# Patient Record
Sex: FEMALE | Race: WHITE | NOT HISPANIC OR LATINO | Employment: OTHER | URBAN - METROPOLITAN AREA
[De-identification: names, ages, dates, MRNs, and addresses within clinical notes are randomized per-mention and may not be internally consistent; named-entity substitution may affect disease eponyms.]

---

## 2018-09-12 ENCOUNTER — RECORDS - HEALTHEAST (OUTPATIENT)
Dept: LAB | Facility: CLINIC | Age: 53
End: 2018-09-12

## 2018-09-12 LAB
T4 FREE SERPL-MCNC: 0.8 NG/DL (ref 0.7–1.8)
TSH SERPL DL<=0.005 MIU/L-ACNC: 2.07 UIU/ML (ref 0.3–5)

## 2018-09-13 LAB — 25(OH)D3 SERPL-MCNC: 28.2 NG/ML (ref 30–80)

## 2019-02-14 ENCOUNTER — HOSPITAL ENCOUNTER (OUTPATIENT)
Dept: BEHAVIORAL HEALTH | Facility: CLINIC | Age: 54
Discharge: HOME | End: 2019-02-14
Attending: PSYCHIATRY & NEUROLOGY | Admitting: PSYCHIATRY & NEUROLOGY
Payer: MEDICARE

## 2019-02-14 ENCOUNTER — BEH TREATMENT PLAN (OUTPATIENT)
Dept: BEHAVIORAL HEALTH | Facility: CLINIC | Age: 54
End: 2019-02-14
Attending: PSYCHIATRY & NEUROLOGY

## 2019-02-14 DIAGNOSIS — F33.1 MODERATE EPISODE OF RECURRENT MAJOR DEPRESSIVE DISORDER (H): Primary | ICD-10-CM

## 2019-02-14 DIAGNOSIS — F31.0 BIPOLAR I DISORDER, CURRENT OR MOST RECENT EPISODE HYPOMANIC (H): ICD-10-CM

## 2019-02-14 PROCEDURE — 90791 PSYCH DIAGNOSTIC EVALUATION: CPT | Performed by: PSYCHOLOGIST

## 2019-02-14 ASSESSMENT — PAIN SCALES - GENERAL: PAINLEVEL: WORST PAIN (10)

## 2019-02-14 NOTE — PROGRESS NOTES
Acknowledgement of Current Treatment Plan       I have reviewed my Initial Individual Treatment Plan with my therapist / on 2/14/2019.   I agree with the plan as it is written in the electronic health record.                      Signature Below:  Marissa Saenz      Patient      Bright Weinstein MA [facility]  DA Psychotherapist    Admitting Provider: Admitting Provider Signature Below:   Dr Curt Bloom MD   Psychiatrist    Dr Hyun Boswell MD  Psychiatrist    Dr Tom Mullen MD  Psychiatrist    Dr Martinez Jasmine MD  Psychiatrist    Marissa Holland, MINA  Psychiatric Provider

## 2019-02-14 NOTE — PROGRESS NOTES
"Initial Individual Treatment Plan     Patient: Marissa Saenz   MRN: [id number]  : 1965  Age: 53 year old  Sex: female    Diagnostic Assessment Date / Date of Initial Individual Treatment Plan: 19      Immediate Health Concerns:  Yes. Identify health concern and plan to address: Pt reports she has leg pain and will follow up with [facility] in [facility].      Immediate Safety Concerns:  No    Identify the issues to be addressed in treatment:  Symptom Management, Community Resources/Discharge Planning, Abstinence/Relapse Prevention, Develop / Improve Independent Living Skills and Develop Socialization / Interpersonal Relationship Skills    Client Initial Individualized Goals for Treatment: \"I want to be better, I want to get out and have fun, I need to be around more people, have more friends, be social, live my life.\"       Initial Treatment suggestions for the client during the time between Diagnostic Assessment and completion of the Individualized Treatment Plan:  Abstain from Substance Use   Ask for more information, support and/or assistance as needed.  Follow up with providers/community supports as needed:   Report increases or changes in symptoms to staff.  Report any personal safety concerns to staff.   Take medications as prescribed.  Report medication changes and/or side effects to staff.  Attend and participate in groups as scheduled or notify staff if unable to do so.  Report any use of substances to staff as this may impact your symptoms and/or personal safety.  Notify staff if you have any other issues that need to be addressed. This may include any current abuse / neglect / exploitation or other vulnerability.  Follow recommendations of your treatment team and discuss concerns if not in agreement.     Treatment Team Responsible: MI/CD Treatment ([facility])      Therapeutic Interventions/Treatment Strategies may include:  Support, Redirection, Feedback, Limit/Boundaries, Safety Assessments, " Structured Activity, Problem Solving, Clarification, Education, Motivational Enhancement and Relapse Prevention as needed.    Jordan Weinstein MA [facility]
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
## 2019-03-01 NOTE — PROGRESS NOTES
"Adult Dual Disorder Progress Note / Treatment Plan Review       Patient: Marissa Saenz   MRN: [id number]  : 1965  Age: 53 year old  Sex: female    Week of: 3/4/2019-3/8/2019     Client Initial Individualized Goals for Treatment: \"I want to be better, I want to get out and have fun, I need to be around more people, have more friends, be social, live my life.\"     See Initial Treatment suggestions for the client during the time between Diagnostic Assessment and completion of the Master Individualized Treatment Plan.    Treatment Goals:     Abstain from Substance Use   Ask for more information, support and/or assistance as needed.  Follow up with providers/community supports as needed:   Report increases or changes in symptoms to staff.  Report any personal safety concerns to staff.   Take medications as prescribed.  Report medication changes and/or side effects to staff.  Attend and participate in groups as scheduled or notify staff if unable to do so.  Report any use of substances to staff as this may impact your symptoms and/or personal safety.  Notify staff if you have any other issues that need to be addressed. This may include any current abuse / neglect / exploitation or other vulnerability.  Follow recommendations of your treatment team and discuss concerns if not in agreement.    Active Psychiatric Symptoms Impairing:   Mood and Behavior    Area of Treatment Focus:  Symptom Management, Personal Safety, Community Resources/Discharge Planning, Abstinence/Relapse Prevention, Develop / Improve Independent Living Skills, Develop Socialization / Interpersonal Relationship Skills, Cultural / Spirituality and Physical Health     Treatment Strategies:   Support, Redirection, Feedback, Limit/Boundaries, Safety Assessments, Structured Activity, Problem Solving, Clarification, Education, Motivational Enhancement Therapy and Cognitive Behavioral Therapy    Patient Response:  Accepted Feedback, Gave Feedback, " Listened, Focused on Goals, Attentive, Accepted Support and Alert      Dimension Risk Description and Outcome:    Dimension One: Acute Intoxication/Withdrawal Potential   Daily Note:3/7/2019 Group Therapy 11:00-11:50 Brittany reports that her last use of alcohol was on 3/3/2019 (LE)    Dimension Two: Biomedical Conditions and Complications  Daily Note:3/5/2019 Group Therapy 10:00-10:50 Brittany reported that her leg hurt today and that she went to the pharmacy to buy alieve but couldn't find the pharmacy (LE)    Dimension Three: Emotional/Behavioral / Cognitive Conditions & Complications  Daily Note:3/4/2019 Group Therapy 11:00-11:50 Brittany checked in and reported that she is glad she is doing treatment because it is the first time she is doing something for herself in a long time, Brittany sat quietly during the group, she shared later that she is struggling with pain in her leg (LE)  3/5/2019 Group Therapy 10:00-10:50 Brittany checked in and reported that she is focusing on staying sober, she joined in the discussion about asking for help and black and white thinking, she was supportive of other group members (LE)  3/6/2019 Group Therapy 11:00-11:50 Brittany checked in and reported that she is struggling to follow through on tasks, she joined in discussion about reframing what doctors say and gave feedback to other group members (LE)  3/7/2019 Group Therapy 11:00-11:50 Brittany checked in and reported that she has been struggling with following through with tasks, she is thinking about switching rooms with her son since being in her room is a trigger, she gave some feedback to group members and was engaged in session (LE)  Dimension Four: Treatment Acceptance / Resistance  Daily Note:  3/4/2019:  Interpersonal Relationships (10-10:50am) Brittany introduced herself to the group and listened attentively to the topic that was presented.  She states that she would benefit from more teaching on assertiveness skills.  (HEIDI)  3/5//2019 MICD Education  "12:00-12:50 Psychotherapist used handouts on cognitive distortions and how to recognize  twisted thinking  and what to do to change it.  Pt verbalized understanding of the session by engaging with the feedback and discussion from other group members.  Client would benefit from additional opportunities to practice and implement content from this session (LE)  3/7/2019 Emotions Management (10-10:50am) Brittany was active and engaged during group and acknowledged her understanding of the topic by sharing how handouts/worksheets were helpful for her due to her difficulty with concentration and slowing her thoughts down and having it in front of her was useful. Brittany will benefit from continued education on emotion regulation using skill worksheets/handouts that she can follow along with and getting repetitive information due to difficulty concentrating while in group. (KJL)    Dimension Five: Continued Use/Relapse Prevention  Daily Note: Daily Note:3/6/2019 ([id number]) pt reports last use 3/4/2019. Pt reports no urges to use. Pt listened to peers read theiir \"good-bye letters\" and participated in group discussion (TARI).     Dimension Six: Recovery Environment  Daily Note:3/7/2019 Group Therapy 11:00-11:50 Brittany reported that she used to drink in her room and that she is considering changing rooms since it brings up habits and memories being in her room (LE)    Weekly Progress / Treatment Plan Review      Are there additional progress notes for this week? Yes (see additional progress notes)    Are Treatment Plan Goals being addressed? Yes, continue treatment goals    Are treatment plan strategies to address goals effective? Yes, continue treatment strategies    Are there any current contracts in place? No    Client: na     Client: na    Was client case reviewed with Tom Mullen MD and/or Hyun Boswell MD and the treatment team this week? yes    Psychotherapists contributing to this note:    Group: Psychotherapy Date/Time: " 3/4/2019 / 1100 to 1150; Number of Participants: 7  Facilitated by: AVIS Martinez (BRIAN)    Group: Interpersonal Relationships Date/Time: 3/4/2019 / 1000 to 1050; Number of Participants: 7    Facilitated by: SHARON Guerin(HEIDI)    Group: Psychotherapy Date/Time: 3/5/2019 / 1000 to 1050; Number of Participants: 4  Facilitated by: AVIS Martinez (BRIAN)    Group: MICD Education Date/Time: 3/5/2019 / 1200 to 1250; Number of Participants: 4  Facilitated by: AVIS Martinez (BRIAN)    Group: Psychotherapy Date/Time: 3/6/2019 / 1100 to 1150; Number of Participants: 6  Facilitated by: AVIS Martinez (BRIAN)    Group: Relapse Prevention Date/Time: 3/6/2019 1200 to 1250; Number of Participants: 6    Facilitated by: Bright Weinstein MA [facility]    Group: Emotions Management Date/Time: 3/7/2019  / 1000 to 1050; Number of Participants: 7    Facilitated by: IBAN Talamantes Intern (KJL)    Group: Psychotherapy Date/Time: 3/7/2019 / 1100 to 1150; Number of Participants: 7  Facilitated by: AVIS Martinez (BRIAN)        Co-Sign: This (diagnostic assessment, individual treatment plan, treatment plan review or progress note) has been reviewed and approved by meMiya MA, WALTER, [facility]  in accordance with the requirements for Clinical Supervision of Outpatient Mental Health Services.  Miya Farris MA, WALTER, [facility]  3/10/2019

## 2019-03-04 ENCOUNTER — HOSPITAL ENCOUNTER (OUTPATIENT)
Dept: BEHAVIORAL HEALTH | Facility: CLINIC | Age: 54
End: 2019-03-04
Attending: PSYCHIATRY & NEUROLOGY
Payer: MEDICARE

## 2019-03-04 PROBLEM — F31.0 BIPOLAR I DISORDER, CURRENT OR MOST RECENT EPISODE HYPOMANIC (H): Status: ACTIVE | Noted: 2019-03-04

## 2019-03-04 LAB
AMPHETAMINES UR QL SCN: NEGATIVE
BARBITURATES UR QL: NEGATIVE
BENZODIAZ UR QL: NEGATIVE
CANNABINOIDS UR QL SCN: NEGATIVE
COCAINE UR QL: NEGATIVE
ETHANOL UR QL SCN: NEGATIVE
OPIATES UR QL SCN: NEGATIVE

## 2019-03-04 PROCEDURE — H2012 BEHAV HLTH DAY TREAT, PER HR: HCPCS

## 2019-03-04 PROCEDURE — 80307 DRUG TEST PRSMV CHEM ANLYZR: CPT | Performed by: PSYCHIATRY & NEUROLOGY

## 2019-03-04 PROCEDURE — 80320 DRUG SCREEN QUANTALCOHOLS: CPT | Performed by: PSYCHIATRY & NEUROLOGY

## 2019-03-04 ASSESSMENT — PATIENT HEALTH QUESTIONNAIRE - PHQ9
5. POOR APPETITE OR OVEREATING: NEARLY EVERY DAY
SUM OF ALL RESPONSES TO PHQ QUESTIONS 1-9: 12

## 2019-03-04 ASSESSMENT — ANXIETY QUESTIONNAIRES
6. BECOMING EASILY ANNOYED OR IRRITABLE: NEARLY EVERY DAY
5. BEING SO RESTLESS THAT IT IS HARD TO SIT STILL: NEARLY EVERY DAY
IF YOU CHECKED OFF ANY PROBLEMS ON THIS QUESTIONNAIRE, HOW DIFFICULT HAVE THESE PROBLEMS MADE IT FOR YOU TO DO YOUR WORK, TAKE CARE OF THINGS AT HOME, OR GET ALONG WITH OTHER PEOPLE: VERY DIFFICULT
3. WORRYING TOO MUCH ABOUT DIFFERENT THINGS: NOT AT ALL
7. FEELING AFRAID AS IF SOMETHING AWFUL MIGHT HAPPEN: NOT AT ALL
1. FEELING NERVOUS, ANXIOUS, OR ON EDGE: SEVERAL DAYS
2. NOT BEING ABLE TO STOP OR CONTROL WORRYING: NOT AT ALL
GAD7 TOTAL SCORE: 10

## 2019-03-04 NOTE — PROGRESS NOTES
"Adult Mental Health Outpatient Group Therapy Progress Note     Client Initial Individualized Goals for Treatment:  \"I want to be better, I want to get out and have fun, I need to be around more people, have more friends, be social, live my life.\"        See Initial Treatment suggestions for the client during the time between Diagnostic Assessment and completion of the Master Individualized Treatment Plan.    Treatment Goals:     see above     Area of Treatment Focus:  Symptom Management, Community Resources/Discharge Planning, Abstinence/Relapse Prevention and Develop / Improve Independent Living Skills    Therapeutic Interventions/Treatment Strategies:  Support, Feedback, Structured Activity, Problem Solving, Clarification, Education and Motivational Enhancement Therapy    Response to Treatment Strategies:  Accepted Feedback, Listened, Focused on Goals, Attentive and Accepted Support     Name of Group: Goal Setting   Date/Time: 3/4/19 / 12:00 to 12:50    Number of Group Participants: 5     Description and Outcome:  Brittany participated in group that focused on learning about the benefits and practicing of setting realistic goals for treatment and management of mental and chemical health, along with self reflection on accomplishments and practicing problem solving obstacles. This was her first goal setting group and she followed the process and was able to share her goals. She noted her accomplishment from the previous week as \"working herself up to be able to start treatment\". She noted she had drank the previous evening, after being sober for a month, having the \"stinking thinking\" of using once more prior to treatment. She set a couple of small goals, focusing on settling into groups and sharing.  Client verbalized understanding of session content by noting benefits of focus and direction..  Client would benefit from additional opportunities to practice and implement content from this session.    Is this a Weekly " Review of the Progress on the Treatment Plan?  No

## 2019-03-04 NOTE — PROGRESS NOTES
Admission Date: 3/4/2019    The Initial Individual Treatment Plan was reviewed with Marissa Saenz upon admission.      Marissa reported her last use of substances as: 3/3/19 carol and matt about a 1/2 pint of barcardi 2/26/2019 did some cocaine, a few lines    Identify any current concerns with potential to impact admission:     withdrawal/intoxication: reports that she is not feeling any withdrawal symptoms, last drink was 8pm last night.     medication/medical concerns: reports pt is not on any medications, wants to see Dr Mullen     immediate safety concerns: na     Other: reports she drank last night to get it out of her system, had previously not drank for 2 months    DIMENSION  ADMIT RATING   1 Acute Intoxication / Withdrawal Potential 0   2 Biomedical Conditions & Complications 1   3 Emotional / Behavioral / Cognitive Conditions & Complications 2   4 Treatment Acceptance / Resistance: 1   5 Continued Use / Relapse Prevention 3   6 Recovery Environment 1         Completed by: AVIS Martinez

## 2019-03-05 ENCOUNTER — HOSPITAL ENCOUNTER (OUTPATIENT)
Dept: BEHAVIORAL HEALTH | Facility: CLINIC | Age: 54
End: 2019-03-05
Attending: PSYCHIATRY & NEUROLOGY
Payer: MEDICARE

## 2019-03-05 PROCEDURE — H2012 BEHAV HLTH DAY TREAT, PER HR: HCPCS

## 2019-03-05 ASSESSMENT — ANXIETY QUESTIONNAIRES: GAD7 TOTAL SCORE: 10

## 2019-03-06 ENCOUNTER — HOSPITAL ENCOUNTER (OUTPATIENT)
Dept: BEHAVIORAL HEALTH | Facility: CLINIC | Age: 54
End: 2019-03-06
Attending: PSYCHIATRY & NEUROLOGY
Payer: MEDICARE

## 2019-03-06 PROCEDURE — H2012 BEHAV HLTH DAY TREAT, PER HR: HCPCS

## 2019-03-07 ENCOUNTER — HOSPITAL ENCOUNTER (OUTPATIENT)
Dept: BEHAVIORAL HEALTH | Facility: CLINIC | Age: 54
End: 2019-03-07
Attending: PSYCHIATRY & NEUROLOGY
Payer: MEDICARE

## 2019-03-07 PROCEDURE — H2012 BEHAV HLTH DAY TREAT, PER HR: HCPCS

## 2019-03-07 NOTE — PROGRESS NOTES
"Adult Mental Health Outpatient Group Therapy Progress Note     Client Initial Individualized Goals for Treatment:  \"I want to be better, I want to get out and have fun, I need to be around more people, have more friends, be social, live my life.\"        See Initial Treatment suggestions for the client during the time between Diagnostic Assessment and completion of the Master Individualized Treatment Plan.    Treatment Goals:     see above     Area of Treatment Focus:  Symptom Management, Community Resources/Discharge Planning, Abstinence/Relapse Prevention and Develop / Improve Independent Living Skills    Therapeutic Interventions/Treatment Strategies:  Support, Feedback, Structured Activity, Problem Solving, Clarification, Education and Motivational Enhancement Therapy    Response to Treatment Strategies:  Accepted Feedback, Listened, Focused on Goals, Attentive and Accepted Support     Name of Group: Life Skills   Date/Time: 3/7/19 / 12:00 to 12:50    Number of Group Participants: 7    Description and Outcome:  Brittany attended and participated in an experiential Psychoeducation group where rehabilitative intervention focuses on learning, developing through practice, and generalizing independent living skills. The purpose of this group is to stabilize and manage mental health symptoms, reduce/eliminate substance use, and to improve participation and function in valued roles, routines, relationships. She was open to complete a problem check list re: daily living skills and activities. She noted problems with attention and isolating at home. She noted getting easily bored with activities, except for working on cars. She did note some household management tasks that she struggles with, but also noted things she does do. As staff offered suggestions for goals and techniques to try to practice, she noted some initial uncertainty re: this. Will continue to assess.  Client verbalized understanding of session content by " noting her problem areas with daily living skills. Will offer more orientation as needed. She did note limited leisure activities she engages in at this time and not knowing relaxation techniques for herself.  Client would benefit from additional opportunities to practice and implement content from this session.    Is this a Weekly Review of the Progress on the Treatment Plan?  No

## 2019-03-08 NOTE — PROGRESS NOTES
"Adult Dual Disorder Progress Note / Treatment Plan Review       Patient: Marissa Saenz   MRN: [id number]  : 1965  Age: 53 year old  Sex: female    Week of: 3/11/2019-3/15/2019     Client Initial Individualized Goals for Treatment: \"I want to be better, I want to get out and have fun, I need to be around more people, have more friends, be social, live my life.\"     See Initial Treatment suggestions for the client during the time between Diagnostic Assessment and completion of the Master Individualized Treatment Plan.    Treatment Goals:    1.  Brittany will remain sober by going to AA meeting at least 1x per week  2.  Brittany will obtain a sponsor by 3/26/2019  3.  Brittany will learn about her triggers and learn ways to cope with them by not drinking.  1.  Brittany will meet with Dr. Mullen and follow all of his recommendations.  2.  Brittany will learn how to listen to her body and identify how she feels and how to manage her emotions.  3. Brittany will learn and practice coping skills weekly to figure out what works for her.  4.  Brittany will start going to doctor appts and report her attendance weekly, she will follow up as needed.    Active Psychiatric Symptoms Impairing:   Mood and Behavior    Area of Treatment Focus:  Symptom Management, Personal Safety, Community Resources/Discharge Planning, Abstinence/Relapse Prevention, Develop / Improve Independent Living Skills, Develop Socialization / Interpersonal Relationship Skills, Cultural / Spirituality and Physical Health     Treatment Strategies:   Support, Redirection, Feedback, Limit/Boundaries, Safety Assessments, Structured Activity, Problem Solving, Clarification, Education, Motivational Enhancement Therapy and Cognitive Behavioral Therapy    Patient Response:  Accepted Feedback, Gave Feedback, Listened, Focused on Goals, Attentive, Accepted Support and Alert      Dimension Risk Description and Outcome:    Dimension One: Acute Intoxication/Withdrawal Potential   Daily " Note:3/13/2019 Group Therapy 11:00-11:50 Brittany reports no use since she started the program (LE)    Dimension Two: Biomedical Conditions and Complications  Daily Note:3/12/2019 Group Therapy 10:00-10:50 Brittany reported being tired and having some pain today, she took aleive and that helps a little bit (LE)    Dimension Three: Emotional/Behavioral / Cognitive Conditions & Complications  Daily Note:3/12/2019 Group Therapy 10:00-10:50 Brittany checked in and reported that she has been struggling with taking care of herself, she takes care of others and ignores her own needs, Pt reported hoping that Friday will change with her not watching the kids as much, Brittany was supportive of other group members (LE)  3/13/2019 Group Therapy 11:00-11:50 Brittany checked in and reported that she is having difficulty sleeping, and when she cant sleep she has urges to drink, Brittany processed her inability to sleep and accepted feedback on ideas of what she can do from other group members, she gave support and feedback to other group members (LE)  Dimension Four: Treatment Acceptance / Resistance  Daily Note:3/12/2019 Autobiography 11:00-11:50  Brittany listened to another group member tell their autobiography, Pt verbalized understanding of the session by engaging with the feedback and discussion from other group members.  Client would benefit from additional opportunities to practice and implement content from this session (LE)    Dimension Five: Continued Use/Relapse Prevention  Daily Note:3/13/2019 Group Therapy 11:00-11:50 Brittany reports she has not drank since starting the program (LE) 3/13/2019 ([id number]) Pt reports her last use as 3/3/19, Pt reports she struggles with urges to use. Pt reports her pain issues lead to a lack of sleep that makes her want to use. She reports she used to self medicate her pain with alcohol. Pt reports she is hopeful given a psychiatry appointment 3/22/19 and another MD appointment 3/21/19. Pt participated in group discussion  regarding Relapse Prevention Plans (TARI).       Dimension Six: Recovery Environment  Daily Note:3/13/2019 Group Therapy 11:00-11:50 Brittany reports stress at home due to watching her grandchildren (LE)    Weekly Progress / Treatment Plan Review      Are there additional progress notes for this week? Yes (see additional progress notes)    Are Treatment Plan Goals being addressed? Yes, continue treatment goals    Are treatment plan strategies to address goals effective? Yes, continue treatment strategies    Are there any current contracts in place? No    Client: Agrees with treatment plan changes     Client: Received copy of revised treatment plan    Was client case reviewed with Tom Mullen MD and/or Hyun Boswell MD and the treatment team this week? yes    Psychotherapists contributing to this note:    Group: Psychotherapy Date/Time: 3/12/2019 / 1000 to 1050; Number of Participants: 5  Facilitated by: AVIS Martinez (BRIAN)    Group: Autobiography Date/Time: 3/12/2019 / 1100 to 1150; Number of Participants: 5  Facilitated by: AVIS Martinez (BRIAN)    Group: Psychotherapy Date/Time: 3/13/2019 / 1100 to 1150; Number of Participants: 7  Facilitated by: AVIS Martinez (BRIAN)    Group: Relapse Prevention Date/Time: 3/13/2019 / 1200 to 1250; Number of Participants: 7    Facilitated by: Bright Weinstein MA [facility]

## 2019-03-12 ENCOUNTER — HOSPITAL ENCOUNTER (OUTPATIENT)
Dept: BEHAVIORAL HEALTH | Facility: CLINIC | Age: 54
End: 2019-03-12
Attending: PSYCHIATRY & NEUROLOGY
Payer: MEDICARE

## 2019-03-12 PROCEDURE — H2012 BEHAV HLTH DAY TREAT, PER HR: HCPCS

## 2019-03-12 NOTE — PROGRESS NOTES
Adult Dual Disorder Program:  Individualized Treatment Plan     Date of Plan: 3/12/2019    Name: Marissa Saenz MRN: [id number]    : 1965    Programs:  MI/CD Treatment (MICD)     Clinical Track (if applicable):  Group 3    DSM5 Diagnosis  296.40 Bipolar I Disorder Current or Most Recent Episode Hypomanic  300.22 (F40.00) Agoraphobia  303.90 (f10.20) Alcohol Use Disorder, Severe  Hx of ADHD    Adult Dual Disorder Program Multidisciplinary Team Members: Hyun Boswell MD and/or Dr Tom Mullen; Trini López, [facility]; Bright Weinstein MA, LP; Sun Hansen, OTR/L; Janeth Anderson RN, PHN; AVIS Martinez    Marissa Saenz will participate in the Adult Dual Disorder Program  5 days per week, 3 hours per day. Anticipated duration/discharge: 6-8 weeks    Program Start Date: 3/4/2019  Anticipated Discharge Date: 2019 (pending authorization/clinical changes)    NOTE: Complete CGI     Review Date: Does Marissa Saenz continue to meet criteria to participate in the Adult Dual Disorder Program, 5 days per week; 3 hours per day?   2019 Yes, on a contract   5/3/19 no client is discharging from this program and will begin Day Treatment program next week                   Client Strengths:  caring, committed to sobriety, creative, empathetic, goal-focused, good listener, insightful, motivated, open to learning, open to suggestions / feedback, responsible parent, support of family, friends and providers, supportive, wants to learn, willing to ask questions, willing to relate to others and work history    Client Participation in Plan:  Contributed to goals and plan   Attended individual treatment plan meeting on 3/12/2019  Agrees with plan   Received copy of treatment plan     Areas of Vulnerability:  Manic symptoms   Depressive symptoms     Long-Term Goals:  Knowledge about illness and management of symptoms   Maintenance of sobriety     Abuse Prevention Plan:  Safe, therapeutic environment   Safety  coping plan as needed   Education regarding illness and skill development   Coordination with care providers   Impluse control education and intervention   Medication adjustment/management (MI/CD)   Monitor for use of substances    Discharge Criteria:  Satisfactory progress toward treatment goals   Improvement re: identified problems and symptoms   Ability to continue recovery at next level of service   Has a discharge plan in place   Has safety/coping plan in place   Ability to maintain sobriety  Share autobiography   Complete goodbye letter assignment   Complete coping cards   Regular attendance as scheduled     Areas of Treatment Focus        Area of Treatment Focus:   Abstinence / Relapse Prevention  Start Date:    3/12/2019    Dimension:   V. Relapse and VI. Recovery Environment    Description:  1.  Brittany will remain sober by going to AA, NA or Smart Recovery meeting at least 1x per week  2.  Brittany will obtain a sponsor by 4/26/2019  3.  Brittany will learn about her triggers and learn ways to cope with them by not drinking.    Goal:  Target Date: 5/7/2019 Status: STOPPED  1.  Brittany will remain sober by going to AA, NA or Smart Recovery meeting at least 1x per week  2.  Brittany will obtain a sponsor by 4/26/2019  3.  Brittany will learn about her triggers and learn ways to cope with them by not drinking.      Progress: Brittany reports that she still has not obtained a sponsor, she wants to add a goal for looking into NA and smart recovery as well as AA to decide what is the best fit for her.  She would like to continue her goals    5/3/19: Brittany did not follow through with going to any sober support meetings or getting a sponsor. It is recommended that she continue to work on ways to get sober support. She was open to learn about her triggers and participated in groups learning about skills to use for management instead of drinking.       Treatment Strategies:   Assist clients in establishing / strengthening support network  Assist to  identify treatment goals  Provide feedback about social skills  Teach adaptive coping skills and communication skills  Use reality based supportive approach        Area of Treatment Focus:   Symptom Stabilization and Management  Start Date:    3/12/2019    Dimension:   III. Emotional / Behavioral Condition    Description:  1.  Brittany will meet with Dr. Mullen and follow all of his recommendations.  2.  Brittany will learn how to listen to her body and identify how she feels and how to manage her emotions.  3. Brittany will learn and practice coping skills weekly to figure out what works for her.  4.  Brittany will start going to doctor and dentist appts and report her attendance weekly, she will follow up as needed.    Goal:  Target Date: 5/7/2019 Status: COMPLETED  1.  Brittany will meet with Dr. Mullen and follow all of his recommendations.  2.  Brittany will learn how to listen to her body and identify how she feels and how to manage her emotions.  3. Brittany will learn and practice coping skills weekly to figure out what works for her.  4.  Brittany will start going to doctor and dentist appts and report her attendance weekly, she will follow up as needed.    Progress:Brittany has been working on listening to her body and has noticed an improvement in mindfulness and reactions, she would like to continue the goals.   5/3/19: Brittany continued to work on these goals and completed them. It is recommended that she continue work on emotion management skills, focusing on active practice of using skills, including things that provide her with interest, distraction, and relaxation.    Treatment Strategies:   Assist clients in establishing / strengthening support network  Facilitate increased self awareness  Provide feedback about social skills  Teach adaptive coping skills and communication skills  Use reality based supportive approach      Area of Treatment Focus:   Develop / Improve Independent Living / Socialization Skills  Start Date:    4/8/2019     "Dimension:   III. Emotional / Behavioral Condition    Description:   1Madhav Soto will work on boundary setting with her children, prioritizing her needs and recognizing what her children are capable of doing by themselves without her assistance.  She will work on managing her feelings of guilt by reminding herself that she cannot help anyone if she doesn't take care of herself first.    Goal:  Target Date: 5/7/2019 Status: COMPLETED  1Madhav Soto will work on boundary setting with her children, prioritizing her needs and recognizing what her children are capable of doing by themselves without her assistance.  She will work on managing her feelings of guilt by reminding herself that she cannot help anyone if she doesn't take care of herself first.      Progress:   5/3/19: Brittany has begun to work on boundary setting with her children and focusing on meeting her needs. She would benefit from continued work in this area in the Day Treatment program.     Treatment Strategies:   Provide feedback about social skills  Teach adaptive coping skills and communication skills  Use reality based supportive approach       Angella Buchanan, LMFT      NOTE: Required signatures are completed manually and scanned into the electronic medical record. See \"Media\" tab in epic.    "

## 2019-03-12 NOTE — PROGRESS NOTES
Adult Dual Disorder Program:   Acknowledgement of Current Treatment Plan       I have reviewed my treatment plan with my therapist / counselor on 3/12/2019.   I agree with the plan as it is written in the electronic health record.    Name:      Signature:  Marissa Mullen MD  Psychiatrist    Trini López, [facility]  Psychotherapist    Angella Buchanan LMFT  Psychotherapist    Janeth Anderson RN, PHN  Nurse Liaison    Sun Hansen OTR/L  Occupational Therapist    Bright Weinstein MA, LP  Psychotherapist    Hyun Boswell MD  Psychiatrist/Medical Director

## 2019-03-13 ENCOUNTER — HOSPITAL ENCOUNTER (OUTPATIENT)
Dept: BEHAVIORAL HEALTH | Facility: CLINIC | Age: 54
End: 2019-03-13
Attending: PSYCHIATRY & NEUROLOGY
Payer: MEDICARE

## 2019-03-13 PROCEDURE — H2012 BEHAV HLTH DAY TREAT, PER HR: HCPCS

## 2019-03-14 ENCOUNTER — TELEPHONE (OUTPATIENT)
Dept: BEHAVIORAL HEALTH | Facility: CLINIC | Age: 54
End: 2019-03-14

## 2019-03-14 NOTE — TELEPHONE ENCOUNTER
Writer contacted patient to inquire about absence. Patient stated she did call program and she is not present today due to headache. She reported she is not coming in tomorrow due to daughter having court.       Miya Farris, [place], [facility]  3/14/2019

## 2019-03-18 ENCOUNTER — HOSPITAL ENCOUNTER (OUTPATIENT)
Dept: BEHAVIORAL HEALTH | Facility: CLINIC | Age: 54
End: 2019-03-18
Attending: PSYCHIATRY & NEUROLOGY
Payer: MEDICARE

## 2019-03-18 PROCEDURE — H2012 BEHAV HLTH DAY TREAT, PER HR: HCPCS

## 2019-03-18 NOTE — PROGRESS NOTES
"Adult Dual Disorder Progress Note / Treatment Plan Review       Patient: Marissa Saenz   MRN: [id number]  : 1965  Age: 53 year old  Sex: female    Week of: 3/18/2019-3/22/2019     Client Initial Individualized Goals for Treatment: \"I want to be better, I want to get out and have fun, I need to be around more people, have more friends, be social, live my life.\"       See Initial Treatment suggestions for the client during the time between Diagnostic Assessment and completion of the Master Individualized Treatment Plan.    Treatment Goals:     1.  Brittany will remain sober by going to AA meeting at least 1x per week  2.  Brittany will obtain a sponsor by 3/26/2019  3.  Brittany will learn about her triggers and learn ways to cope with them by not drinking.  1.  Brittany will meet with Dr. Mullen and follow all of his recommendations.  2.  Brittany will learn how to listen to her body and identify how she feels and how to manage her emotions.  3. Brittany will learn and practice coping skills weekly to figure out what works for her.  4.  Brittany will start going to doctor appts and report her attendance weekly, she will follow up as needed.      Active Psychiatric Symptoms Impairing:   Mood and Behavior    Area of Treatment Focus:  Symptom Management and Abstinence/Relapse Prevention    Treatment Strategies:   Support, Redirection, Feedback, Limit/Boundaries, Safety Assessments, Structured Activity, Problem Solving, Clarification, Education, Motivational Enhancement Therapy and Cognitive Behavioral Therapy    Patient Response:  Accepted Feedback, Gave Feedback, Listened, Focused on Goals, Attentive, Accepted Support and Alert      Dimension Risk Description and Outcome:    Dimension One: Acute Intoxication/Withdrawal Potential   Daily Note:3/21/2019 Group Therapy ([id number]) Brittany reports an urge to use alcohol but that she came to group instead and did not use (LE)    Dimension Two: Biomedical Conditions and Complications  Daily " Note:3/18/2019 Group Therapy 11:00-11:50 Mark reports that she was sick last week but is feeling better now (LE)    Dimension Three: Emotional/Behavioral / Cognitive Conditions & Complications  Daily Note:3/18/2019 Group Therapy 11:00-11:50 Mark checked in and reported that she is feeling better since she wont have to care for her grandkids anymore, she is going to focus on taking care of herself and her needs, Mark was supportive of other group members (LE)  3/19/2019 Group Therapy 10:00-10:50 Mark checked in and reports that she was able to get almost everything done on her list of things to do yesterday, she gave support and feedback to other group members (LE)  3/20/2019 Group Therapy 11:00-11:50 Mark checked in and reported that she was up until 3am with her son stefan who wouldn't calm down, she ended up calling the police on him because she could not handle it anymore, she gave and received feedback from group members (LE)  3/21/2019 Group Therapy ([id number]) Mark checked in and reports that she is in a better place than yesterday, she reports that she has plans to meet her needs and attend her appts, mark gave support to other group members (LE)  3/22/2019 Group Therapy 12:00-12:50 Mark checked in and reports that she is feeling excited about going out tonight and seeing a play, she is doing something for herself and she hasnt done that in a while.  Mark gave support and feedback to other group members (LE)  Dimension Four: Treatment Acceptance / Resistance  Daily Note: 3/18/2019: Interpersonal Relationships (12-12:50pm):  Mark participated in the group on Self Esteem. She states that she struggles with poor self esteem as she focuses on the mistakes of her past. Mark states that she hopes that being in programming will help her to break old patterns and start to develop better coping skills.  She would like more teaching on this topic.  (HEIDI)  3/19/2019 Autobiography 11:00-11:50  Mark shared her autobiography and  "reports that it felt good to say some of the things that happened to her, she previously had never said some of the things she had experienced to others.  Pt verbalized understanding of the session by engaging with the feedback and discussion from other group members.  Client would benefit from additional opportunities to practice and implement content from this session (LE)  3/21/2019 Emotions Management (10-10:50am) Writer showed video and facilitated discussion on opposite action skill. Brittany listened attentively to video. Brittany expressed understanding of topic by sharing how she can use skill in day to day life to reduce anger centralized around the difficulty of getting medical rides set up. Brittany can benefit from further assistance in applying skill. (KJL)    Dimension Five: Continued Use/Relapse Prevention  Daily Note:3/20/2019 ([id number]) Pt reports her last use as 3/3/19. Pt reports cravings to use secondary to conflict with her son. Pt participated in group discussion regarding \"Ingredients for Recovery\" (TARI).     Dimension Six: Recovery Environment  Daily Note:3/19/2019 Group Therapy 10:00-10:50 Brittany reports that she feels more at ease in her home since she doesn't have to care for her grandchildren anymore (LE)    Weekly Progress / Treatment Plan Review      Are there additional progress notes for this week? Yes (see additional progress notes)    Are Treatment Plan Goals being addressed? Yes, continue treatment goals    Are treatment plan strategies to address goals effective? Yes, continue treatment strategies    Are there any current contracts in place? No    Client: na     Client: na    Was client case reviewed with Tom Mullen MD and/or Hyun Boswell MD and the treatment team this week? yes    Psychotherapists contributing to this note:    Group: Interpersonal Relationships Date/Time: 3/18/2019 / 1200 to 1250; Number of Participants: 5    Facilitated by: SHARON Guerin(HEIDI)    Group: " Psychotherapy Date/Time: 3/18/2019 / 1100 to 1150; Number of Participants: 7  Facilitated by: AVIS Martinez (BRIAN)    Group: Psychotherapy Date/Time: 3/19/2019 / 1000 to 1050; Number of Participants: 4  Facilitated by: AVIS Martinez (BRIAN)    Group: Autobiography Date/Time: 3/19/2019 / 1100 to 1150; Number of Participants: 4  Facilitated by: AVIS Martinez (BRIAN)    Group: Psychotherapy Date/Time: 3/20/2019 / 1100 to 1150; Number of Participants: 5  Facilitated by: AVIS Martinez (BRIAN)    Group: Relapse Prevention Date/Time: 3/20/2019 / 1200 to 1250; Number of Participants: 5    Facilitated by: Bright Weinstein MA [facility]    Group: Emotions Management Date/Time: 3/21/2019 / 1000 to 1050; Number of Participants: 5    Facilitated by: IBAN Talamantes (THELMAL)    Group: Psychotherapy Date/Time: 3/21/2019 / 1100 to 1150; Number of Participants: 5  Facilitated by: AVIS Martinez (BRIAN)    Group: Psychotherapy Date/Time: 3/22/2019 / 1200 to 1250; Number of Participants: 4  Facilitated by: AVIS Martinez (BRIAN)    Co-Sign: This (diagnostic assessment, individual treatment plan, treatment plan review or progress note) has been reviewed and approved by meMiya MA, [facility], [facility]  in accordance with the requirements for Clinical Supervision of Outpatient Mental Health Services.  Miya Farris MA, [facility], [facility]  3/24/2019

## 2019-03-18 NOTE — PROGRESS NOTES
"Adult Mental Health Outpatient Group Therapy Progress Note     Client Initial Individualized Goals for Treatment:  \"I want to be better, I want to get out and have fun, I need to be around more people, have more friends, be social, live my life.\"        See Initial Treatment suggestions for the client during the time between Diagnostic Assessment and completion of the Master Individualized Treatment Plan.    Treatment Goals:     see above     Area of Treatment Focus:  Symptom Management, Community Resources/Discharge Planning, Abstinence/Relapse Prevention and Develop / Improve Independent Living Skills    Therapeutic Interventions/Treatment Strategies:  Support, Feedback, Structured Activity, Problem Solving, Clarification, Education and Motivational Enhancement Therapy    Response to Treatment Strategies:  Accepted Feedback, Listened, Focused on Goals, Attentive and Accepted Support     Name of Group: Life Skills   Date/Time: 3/18/19 / 10:00 to 10:50    Number of Group Participants: 7    Description and Outcome:  Brittany attended and participated in an experiential Psychoeducation group where rehabilitative intervention focuses on learning, developing through practice, and generalizing independent living skills. The purpose of this group is to stabilize and manage mental health symptoms, reduce/eliminate substance use, and to improve participation and function in valued roles, routines, relationships. Topic for the group was on how to increase motivation, focusing on defining challenges for individuals, looking at types of intrinsic and extrinsic motivators and strategies and skills to use for improvement in this area. Also discussed and identified were specific motivators that individuals had for themselves, along with practice of planning for how to use these strategies for a specific goal for themselves. She was active in the group and shared challenges for herself with motivation to follow through with some goals " and tasks. Client verbalized understanding of session content by noting one goal she had followed through on and used breaking things into small steps. She also engaged in the exercise and continued this practice, along with noting some rewards she could give herself for follow through with steps.  Client would benefit from additional opportunities to practice and implement content from this session.    Is this a Weekly Review of the Progress on the Treatment Plan?  No

## 2019-03-19 ENCOUNTER — HOSPITAL ENCOUNTER (OUTPATIENT)
Dept: BEHAVIORAL HEALTH | Facility: CLINIC | Age: 54
End: 2019-03-19
Attending: PSYCHIATRY & NEUROLOGY
Payer: MEDICARE

## 2019-03-19 PROCEDURE — H2012 BEHAV HLTH DAY TREAT, PER HR: HCPCS

## 2019-03-20 ENCOUNTER — HOSPITAL ENCOUNTER (OUTPATIENT)
Dept: BEHAVIORAL HEALTH | Facility: CLINIC | Age: 54
End: 2019-03-20
Attending: PSYCHIATRY & NEUROLOGY
Payer: MEDICARE

## 2019-03-20 PROCEDURE — H2012 BEHAV HLTH DAY TREAT, PER HR: HCPCS

## 2019-03-21 ENCOUNTER — HOSPITAL ENCOUNTER (OUTPATIENT)
Dept: BEHAVIORAL HEALTH | Facility: CLINIC | Age: 54
End: 2019-03-21
Attending: PSYCHIATRY & NEUROLOGY
Payer: MEDICARE

## 2019-03-21 ENCOUNTER — RECORDS - HEALTHEAST (OUTPATIENT)
Dept: LAB | Facility: CLINIC | Age: 54
End: 2019-03-21

## 2019-03-21 LAB
CHOLEST SERPL-MCNC: 182 MG/DL
CK SERPL-CCNC: 101 U/L (ref 30–190)
ERYTHROCYTE [SEDIMENTATION RATE] IN BLOOD BY WESTERGREN METHOD: 10 MM/HR (ref 0–20)
FASTING STATUS PATIENT QL REPORTED: NO
HDLC SERPL-MCNC: 44 MG/DL
LDLC SERPL CALC-MCNC: 117 MG/DL
RHEUMATOID FACT SERPL-ACNC: <15 IU/ML (ref 0–30)
TRIGL SERPL-MCNC: 107 MG/DL

## 2019-03-21 PROCEDURE — H2012 BEHAV HLTH DAY TREAT, PER HR: HCPCS

## 2019-03-21 PROCEDURE — 90792 PSYCH DIAG EVAL W/MED SRVCS: CPT | Performed by: PSYCHIATRY & NEUROLOGY

## 2019-03-21 NOTE — PROGRESS NOTES
"Adult Mental Health Outpatient Group Therapy Progress Note     Client Initial Individualized Goals for Treatment:  \"I want to be better, I want to get out and have fun, I need to be around more people, have more friends, be social, live my life.\"        See Initial Treatment suggestions for the client during the time between Diagnostic Assessment and completion of the Master Individualized Treatment Plan.    Treatment Goals:    1.  Brittany will remain sober by going to AA meeting at least 1x per week  2.  Brittany will obtain a sponsor by 3/26/2019  3.  Brittany will learn about her triggers and learn ways to cope with them by not drinking.  1.  Brittany will meet with Dr. Mullen and follow all of his recommendations.  2.  Brittany will learn how to listen to her body and identify how she feels and how to manage her emotions.  3. Brittany will learn and practice coping skills weekly to figure out what works for her.  4.  Brittany will start going to doctor appts and report her attendance weekly, she will follow up as needed.       Area of Treatment Focus:  Symptom Management, Community Resources/Discharge Planning, Abstinence/Relapse Prevention and Develop / Improve Independent Living Skills    Therapeutic Interventions/Treatment Strategies:  Support, Feedback, Structured Activity, Problem Solving, Clarification, Education and Motivational Enhancement Therapy    Response to Treatment Strategies:  Accepted Feedback, Listened, Focused on Goals, Attentive and Accepted Support     Name of Group: Life Skills   Date/Time: 3/21/19 / 12:00 to 12:50    Number of Group Participants: 4    Description and Outcome:  Brittany attended and participated in an experiential Psychoeducation group where rehabilitative intervention focuses on learning, developing through practice, and generalizing independent living skills. The purpose of this group is to stabilize and manage mental health symptoms, reduce/eliminate substance use, and to improve participation and function in " valued roles, routines, relationships. She chose a technique to try, noting it is unfamiliar to her, but was of interest. She was able to follow directions with minimal assistance and noted progress made for herself. Client verbalized understanding of session content by noting benefits of focus and relaxation for herself, along with trying something new to focus on building confidence.  Client would benefit from additional opportunities to practice and implement content from this session.    Is this a Weekly Review of the Progress on the Treatment Plan?  No
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
## 2019-03-22 ENCOUNTER — HOSPITAL ENCOUNTER (OUTPATIENT)
Dept: BEHAVIORAL HEALTH | Facility: CLINIC | Age: 54
End: 2019-03-22
Attending: PSYCHIATRY & NEUROLOGY
Payer: MEDICARE

## 2019-03-22 LAB
B BURGDOR IGG+IGM SER QL: 0.07 INDEX VALUE
HPV SOURCE: ABNORMAL
HUMAN PAPILLOMA VIRUS 16 DNA: NEGATIVE
HUMAN PAPILLOMA VIRUS 18 DNA: NEGATIVE
HUMAN PAPILLOMA VIRUS FINAL DIAGNOSIS: ABNORMAL
HUMAN PAPILLOMA VIRUS OTHER HR: POSITIVE
SPECIMEN DESCRIPTION: ABNORMAL

## 2019-03-22 PROCEDURE — H2012 BEHAV HLTH DAY TREAT, PER HR: HCPCS

## 2019-03-22 NOTE — PROGRESS NOTES
"Adult Mental Health Outpatient Group Therapy Progress Note     Client Initial Individualized Goals for Treatment:  \"I want to be better, I want to get out and have fun, I need to be around more people, have more friends, be social, live my life.\"        See Initial Treatment suggestions for the client during the time between Diagnostic Assessment and completion of the Master Individualized Treatment Plan.    Treatment Goals:     1.  Brittany will remain sober by going to AA meeting at least 1x per week  2.  Brittany will obtain a sponsor by 3/26/2019  3.  Brittany will learn about her triggers and learn ways to cope with them by not drinking.  1.  Brittany will meet with Dr. Mullen and follow all of his recommendations.  2.  Brittany will learn how to listen to her body and identify how she feels and how to manage her emotions.  3. Brittany will learn and practice coping skills weekly to figure out what works for her.  4.  Brittany will start going to doctor appts and report her attendance weekly, she will follow up as needed.       Area of Treatment Focus:  Symptom Management, Community Resources/Discharge Planning and Abstinence/Relapse Prevention    Therapeutic Interventions/Treatment Strategies:  Support, Feedback, Structured Activity, Problem Solving, Clarification, Education and Motivational Enhancement Therapy    Response to Treatment Strategies:  Accepted Feedback, Listened, Focused on Goals, Attentive and Accepted Support     Name of Group: Life Skills   Date/Time: 3/22/19 / 11:00 to 11:50    Number of Group Participants: 4     Description and Outcome:  Brittany attended and participated in an experiential Psychoeducation group where rehabilitative intervention focuses on learning, developing through practice, and generalizing independent living skills. The purpose of this group is to stabilize and manage mental health symptoms, reduce/eliminate substance use, and to improve participation and function in valued roles, routines, relationships. She " was able to initiated her practice independently and completed another technique. She was able to focus on positive results vs minor problems that had arisen for herself. She then was able to engage in some planning for herself. Client verbalized understanding of session content by noting skills used and focusing on follow through, along with ways to decrease perfectionistic thinking.  Client would benefit from additional opportunities to practice and implement content from this session.    Is this a Weekly Review of the Progress on the Treatment Plan?  Yes.      Are Treatment Plan Goals being addressed?  Yes, continue treatment goals      Are Treatment Plan Strategies to Address Goals Effective?  Yes, continue treatment strategies      Are there any current contracts in place?  No

## 2019-03-22 NOTE — PROGRESS NOTES
"Adult Dual Disorder Progress Note / Treatment Plan Review       Patient: Marissa Saenz   MRN: [id number]  : 1965  Age: 53 year old  Sex: female    Week of: 3/25/2019-3/29/2019     Client Initial Individualized Goals for Treatment: \"I want to be better, I want to get out and have fun, I need to be around more people, have more friends, be social, live my life.\"     See Initial Treatment suggestions for the client during the time between Diagnostic Assessment and completion of the Master Individualized Treatment Plan.    Treatment Goals:     1.  Brittany will remain sober by going to AA meeting at least 1x per week  2.  Brittany will obtain a sponsor by 3/26/2019  3.  Brittany will learn about her triggers and learn ways to cope with them by not drinking.  1.  Brittany will meet with Dr. Mullen and follow all of his recommendations.  2.  Brittany will learn how to listen to her body and identify how she feels and how to manage her emotions.  3. Brittany will learn and practice coping skills weekly to figure out what works for her.  4.  Brittany will start going to doctor appts and report her attendance weekly, she will follow up as needed.    Active Psychiatric Symptoms Impairing:   Mood and Behavior    Area of Treatment Focus:  Symptom Management and Abstinence/Relapse Prevention    Treatment Strategies:   Support, Redirection, Feedback, Limit/Boundaries, Safety Assessments, Structured Activity, Problem Solving, Clarification, Education, Motivational Enhancement Therapy and Cognitive Behavioral Therapy    Patient Response:  Accepted Feedback, Gave Feedback, Listened, Focused on Goals, Attentive, Accepted Support and Alert      Dimension Risk Description and Outcome:    Dimension One: Acute Intoxication/Withdrawal Potential   Daily Note:3/27/2019 Group Therapy 10:00-10:50 Brittany reports that she is still sober (LE)    Dimension Two: Biomedical Conditions and Complications  Daily Note:3/27/2019 Group Therapy 10:00-10:50 Brittany reports she had " pain on Monday and that is why she missed (LE)    Dimension Three: Emotional/Behavioral / Cognitive Conditions & Complications  Daily Note:3/27/2019 Group Therapy 10:00-10:50 Mark checked in and processed that she has been taking care of her grandkids and finds it hard to say no to her daughter when she asks for help, mark accepted feedback and support on her options for how to take care of herself by prioritizing treatment. (LE)  3/28/2019 Group Therapy 10:00-10:50 Mark checked in and reported that she is feeling so much better since she was able to get some sleep last night, she reprots she is planning on getting through her list of things that she needs to do today, she gave and accepted feedback to and from group members (LE)  Dimension Four: Treatment Acceptance / Resistance  Daily Note:3/27/2019 Group Therapy 11:00-11:50 Pt participated in discussion about passive controlling behaviors and how there can be blocks to listening and hearing the other person.  Discussed how they may participate in these behaviors or have blocks to listening at times, and how it may be a barrier to them and their needs. Pt would benefit from additional opportunities to practice and implement content from this session (LE)  3/28/2019 Group Therapy 11:00-11:50 Pt participated in discussion about monitoring mood and planning activities to improve mood and ease depression and anxiety symptoms.   Pt joined in discussion about what activities usually work for them and what may be interesting to try out.  Pt would benefit from additional opportunities to practice and implement content from this session (LE)  Dimension Five: Continued Use/Relapse Prevention  Daily Note:3/27/2019 Group Therapy 10:00-10:50 Mark is at moderate risk for relapse, she reports she does not want to drink but has few coping skills (LE)    Dimension Six: Recovery Environment  Daily Note:3/28/2019 Group Therapy 10:00-10:50 no change (LE)    Weekly Progress / Treatment  Plan Review      Are there additional progress notes for this week? Yes (see additional progress notes)    Are Treatment Plan Goals being addressed? Yes, continue treatment goals    Are treatment plan strategies to address goals effective? Yes, continue treatment strategies    Are there any current contracts in place? No    Client: na     Client: na    Was client case reviewed with Tom Mullen MD and/or Hyun Boswell MD and the treatment team this week? yes    Psychotherapists contributing to this note:    Group: Psychotherapy Date/Time: 3/27/2019 / 1000 to 1050; Number of Participants: 4  Facilitated by: AVIS Martinez (BRIAN)    Group: Psychotherapy Date/Time: 3/27/2019 / 1100 to 1150; Number of Participants: 4  Facilitated by: AVIS Martinez (BRIAN)    Group: Psychotherapy Date/Time: 3/28/2019 / 1000 to 1050; Number of Participants: 4  Facilitated by: AVIS Martinez (BRIAN)    Group: Psychotherapy Date/Time: 3/28/2019 / 1100 to 1150; Number of Participants: 4  Facilitated by: AVIS Martinez)

## 2019-03-25 LAB — ANA SER QL: 0.5 U

## 2019-03-27 ENCOUNTER — HOSPITAL ENCOUNTER (OUTPATIENT)
Dept: BEHAVIORAL HEALTH | Facility: CLINIC | Age: 54
End: 2019-03-27
Attending: PSYCHIATRY & NEUROLOGY
Payer: MEDICARE

## 2019-03-27 PROCEDURE — H2012 BEHAV HLTH DAY TREAT, PER HR: HCPCS

## 2019-03-28 ENCOUNTER — HOSPITAL ENCOUNTER (OUTPATIENT)
Dept: BEHAVIORAL HEALTH | Facility: CLINIC | Age: 54
End: 2019-03-28
Attending: PSYCHIATRY & NEUROLOGY
Payer: MEDICARE

## 2019-03-28 PROCEDURE — H2012 BEHAV HLTH DAY TREAT, PER HR: HCPCS

## 2019-03-28 NOTE — PROGRESS NOTES
"Adult Mental Health Outpatient Group Therapy Progress Note     Client Initial Individualized Goals for Treatment:  \"I want to be better, I want to get out and have fun, I need to be around more people, have more friends, be social, live my life.\"        See Initial Treatment suggestions for the client during the time between Diagnostic Assessment and completion of the Master Individualized Treatment Plan.    Treatment Goals:    1.  Brittany will remain sober by going to AA meeting at least 1x per week  2.  Brittany will obtain a sponsor by 3/26/2019  3.  Brittany will learn about her triggers and learn ways to cope with them by not drinking.  1.  Brittany will meet with Dr. Mullen and follow all of his recommendations.  2.  Brittany will learn how to listen to her body and identify how she feels and how to manage her emotions.  3. Brittany will learn and practice coping skills weekly to figure out what works for her.  4.  Brittnay will start going to doctor appts and report her attendance weekly, she will follow up as needed.       Area of Treatment Focus:  Symptom Management, Community Resources/Discharge Planning, Abstinence/Relapse Prevention and Develop / Improve Independent Living Skills    Therapeutic Interventions/Treatment Strategies:  Support, Feedback, Structured Activity, Problem Solving, Clarification, Education and Motivational Enhancement Therapy    Response to Treatment Strategies:  Accepted Feedback, Listened, Focused on Goals, Attentive and Accepted Support     Name of Group: Life Skills   Date/Time: 3/28/19 / 12:00 to 12:50    Number of Group Participants: 4    Description and Outcome:  Brittany attended and participated in an experiential Psychoeducation group where rehabilitative intervention focuses on learning, developing through practice, and generalizing independent living skills. The purpose of this group is to stabilize and manage mental health symptoms, reduce/eliminate substance use, and to improve participation and function in " valued roles, routines, relationships. She engaged in her work, focusing on practicing a new technique. She followed directions well and was able to recall things and engage in planning and problem solving for herself. Client verbalized understanding of session content by noting skills used and positives about herself this date.   Client would benefit from additional opportunities to practice and implement content from this session.    Is this a Weekly Review of the Progress on the Treatment Plan?  No
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
## 2019-04-01 LAB
BKR LAB AP ABNORMAL BLEEDING: NO
BKR LAB AP BIRTH CONTROL/HORMONES: NORMAL
BKR LAB AP CERVICAL APPEARANCE: NORMAL
BKR LAB AP GYN ADEQUACY: NORMAL
BKR LAB AP GYN INTERPRETATION: NORMAL
BKR LAB AP HPV REFLEX: NORMAL
BKR LAB AP LMP: NORMAL
BKR LAB AP PATIENT STATUS: NORMAL
BKR LAB AP PREVIOUS ABNORMAL: NORMAL
BKR LAB AP PREVIOUS NORMAL: 2012
HIGH RISK?: NO
PATH REPORT.COMMENTS IMP SPEC: NORMAL
RESULT FLAG (HE HISTORICAL CONVERSION): NORMAL

## 2019-04-02 ENCOUNTER — HOSPITAL ENCOUNTER (OUTPATIENT)
Dept: BEHAVIORAL HEALTH | Facility: CLINIC | Age: 54
End: 2019-04-02
Attending: PSYCHIATRY & NEUROLOGY
Payer: MEDICARE

## 2019-04-02 PROCEDURE — H2012 BEHAV HLTH DAY TREAT, PER HR: HCPCS

## 2019-04-02 NOTE — PROGRESS NOTES
"Adult Mental Health Outpatient Group Therapy Progress Note     Client Initial Individualized Goals for Treatment:  \"I want to be better, I want to get out and have fun, I need to be around more people, have more friends, be social, live my life.\"        See Initial Treatment suggestions for the client during the time between Diagnostic Assessment and completion of the Master Individualized Treatment Plan.    Treatment Goals:    1.  Brittany will remain sober by going to AA meeting at least 1x per week  2.  Brittany will obtain a sponsor by 3/26/2019  3.  Brittany will learn about her triggers and learn ways to cope with them by not drinking.  1.  Brittany will meet with Dr. Mullen and follow all of his recommendations.  2.  Brittayn will learn how to listen to her body and identify how she feels and how to manage her emotions.  3. Brittany will learn and practice coping skills weekly to figure out what works for her.  4.  Brittany will start going to doctor appts and report her attendance weekly, she will follow up as needed.       Area of Treatment Focus:  Symptom Management, Community Resources/Discharge Planning, Abstinence/Relapse Prevention and Develop / Improve Independent Living Skills    Therapeutic Interventions/Treatment Strategies:  Support, Feedback, Structured Activity, Problem Solving, Clarification, Education and Motivational Enhancement Therapy    Response to Treatment Strategies:  Accepted Feedback, Listened, Focused on Goals, Attentive and Accepted Support     Name of Group: Life Skills   Date/Time: 4/2/19 / 12:00 to 12:50    Number of Group Participants: 4    Description and Outcome:  Brittany attended and participated in an experiential Psychoeducation group where rehabilitative intervention focuses on learning, developing through practice, and generalizing independent living skills. The purpose of this group is to stabilize and manage mental health symptoms, reduce/eliminate substance use, and to improve participation and function in " valued roles, routines, relationships. She initiated her technique and needed minimal assistance with her next step and engaging in some planning and problem solving. She did note some minor problems, but easily dismissed it and focused more on positives for herself. She shared other leisure interests in the group discussion.Client verbalized understanding of session content by noting skills used and positives about herself this date.   Client would benefit from additional opportunities to practice and implement content from this session.    Is this a Weekly Review of the Progress on the Treatment Plan?  No

## 2019-04-03 ENCOUNTER — HOSPITAL ENCOUNTER (OUTPATIENT)
Dept: BEHAVIORAL HEALTH | Facility: CLINIC | Age: 54
End: 2019-04-03
Attending: PSYCHIATRY & NEUROLOGY
Payer: MEDICARE

## 2019-04-03 PROCEDURE — H2012 BEHAV HLTH DAY TREAT, PER HR: HCPCS

## 2019-04-04 ENCOUNTER — HOSPITAL ENCOUNTER (OUTPATIENT)
Dept: BEHAVIORAL HEALTH | Facility: CLINIC | Age: 54
End: 2019-04-04
Attending: PSYCHIATRY & NEUROLOGY
Payer: MEDICARE

## 2019-04-04 PROCEDURE — H2012 BEHAV HLTH DAY TREAT, PER HR: HCPCS

## 2019-04-05 ENCOUNTER — HOSPITAL ENCOUNTER (OUTPATIENT)
Dept: BEHAVIORAL HEALTH | Facility: CLINIC | Age: 54
End: 2019-04-05
Attending: PSYCHIATRY & NEUROLOGY
Payer: MEDICARE

## 2019-04-05 PROCEDURE — 99213 OFFICE O/P EST LOW 20 MIN: CPT | Performed by: PSYCHIATRY & NEUROLOGY

## 2019-04-05 PROCEDURE — H2012 BEHAV HLTH DAY TREAT, PER HR: HCPCS

## 2019-04-05 NOTE — PROGRESS NOTES
"[facility], [place]   Psychiatric Progress Note        Interim History:   The patient's care was discussed with the treatment team during the weekly team meeting and/or staff's chart notes were reviewed.  Staff report patient has been doing fairly well, had one relapse over the weekend but talked about it in group and accepted support from the group.  Pt says she has found the Elavil helpful for sleep.  She says \"one doesn't work and 2 is too much so I've been taking one and a half.\"  She says she's been getting more sleep and mood is improving.  She says regarding the program \"I've been learning a lot.\"  When she gets urges to drink she tries to analyze the reasons why.  By the time she's thought about it for a while the urge has passed.  She does not want to start any meds for cravings or abstinence, generally doesn't like taking meds but is willing to continue Elavil as she thinks it is helping.  She denies having any SE's except AM grogginess if she takes 50 mg.  She says her anxiety and pain have improved since starting the Elavil as well.  The patient had no further complaints or requests.          Psychiatric Examination:            Appearance: alert  Attitude:  cooperative  Eye Contact:  fair  Mood: less depressed  Affect:  Less constricted  Speech:  Less delayed and slow  Psychomotor Behavior:  no evidence of tardive dyskinesia, dystonia, or tics and intact station, gait and muscle tone  Throught Process:  logical and linear  Associations:  no loose associations  Thought Content:  no evidence of psychotic thought, no SI/HI  Insight:  fair  Judgement:  Intact  Language: Appropriate  Fund of Knowledge: Average  Oriented to:  time, person, and place  Attention Span and Concentration:  intact  Recent and Remote Memory:  intact                 DIagnoses:     Axis I:   1.  Major depressive disorder, recurrent, moderate.   2.  Generalized anxiety disorder.   3.  Alcohol use disorder, " severe, in early remission.   4.  Attention deficit hyperactivity disorder by history.   Axis II:  Deferred.   Axis III:  Chronic knee back and joint pain as well as seasonal allergies.   Axis IV:  Stressors include recurrent problems with mood, anxiety and sleep.  She has abused alcohol in order to sleep for many years.  Strengths include some family support, fairly good insight, willingness to engage in treatment, willingness to work on gaining coping skills.            Plan:     Continue Elavil 25-50 mg at bedtime for depression, anxiety, sleep    Pt has 2-4 weeks left in the program.  She will f/u with her outpatient psychiatrist or PCP.   to ensure appropriate follow up.

## 2019-04-08 ENCOUNTER — HOSPITAL ENCOUNTER (OUTPATIENT)
Dept: BEHAVIORAL HEALTH | Facility: CLINIC | Age: 54
End: 2019-04-08
Attending: PSYCHIATRY & NEUROLOGY
Payer: MEDICARE

## 2019-04-08 PROCEDURE — H2012 BEHAV HLTH DAY TREAT, PER HR: HCPCS

## 2019-04-08 NOTE — PROGRESS NOTES
Adult Dual Disorder Program:   Acknowledgement of Current Treatment Plan       I have reviewed my treatment plan with my therapist / counselor on 4/8/2019.   I agree with the plan as it is written in the electronic health record.    Name:      Signature:  Marsisa Mullen MD  Psychiatrist        Angella Buchanan, LMFT  Psychotherapist    Janeth Anderson RN, PHN  Nurse Liaison    Sun Hansen OTR/L  Occupational Therapist    Bright Weinstein MA, LP  Psychotherapist    Hyun Boswell MD  Psychiatrist/Medical Director

## 2019-04-08 NOTE — PROGRESS NOTES
"Adult Dual Disorder Progress Note / Treatment Plan Review       Patient: Marissa Saenz   MRN: [id number]  : 1965  Age: 53 year old  Sex: female    Week of: 2019-2019     Client Initial Individualized Goals for Treatment: \"I want to be better, I want to get out and have fun, I need to be around more people, have more friends, be social, live my life.\"     See Initial Treatment suggestions for the client during the time between Diagnostic Assessment and completion of the Master Individualized Treatment Plan.    Treatment Goals:    1.  Brittany will meet with Dr. Mullen and follow all of his recommendations.  2.  Brittany will learn how to listen to her body and identify how she feels and how to manage her emotions.  3. Brittany will learn and practice coping skills weekly to figure out what works for her.  4.  Brittany will start going to doctor and dentist appts and report her attendance weekly, she will follow up as needed.  1.  Brittany will work on boundary setting with her children, prioritizing her needs and recognizing what her children are capable of doing by themselves without her assistance.  She will work on managing her feelings of guilt by reminding herself that she cannot help anyone if she doesn't take care of herself first.  1.  Brittany will remain sober by going to AA, NA or Smart Recovery meeting at least 1x per week  2.  Brittany will obtain a sponsor by 2019  3.  Brittany will learn about her triggers and learn ways to cope with them by not drinking.      Active Psychiatric Symptoms Impairing:   Mood and Behavior    Area of Treatment Focus:  Symptom Management and Abstinence/Relapse Prevention    Treatment Strategies:   Support, Redirection, Feedback, Limit/Boundaries, Safety Assessments, Structured Activity, Problem Solving, Clarification, Education, Motivational Enhancement Therapy and Cognitive Behavioral Therapy    Patient Response:  Accepted Feedback, Gave Feedback, Listened, Focused on Goals, Attentive, " Accepted Support and Alert      Dimension Risk Description and Outcome:    Dimension One: Acute Intoxication/Withdrawal Potential   Daily Note:4/8/2019 Group Therapy 11:00-11:50 pt reports no concern (LE)    Dimension Two: Biomedical Conditions and Complications  Daily Note:4/8/2019 Group Therapy 11:00-11:50 pt reports that she would like to make a dentist appt this week (LE)    Dimension Three: Emotional/Behavioral / Cognitive Conditions & Complications  Daily Note:4/8/2019 Group Therapy 11:00-11:50 Mark checked in and reported that she was feeling less stressed, she processed how her ability to stop and think about what is going on with her and her needs has improved, mark gave feedback and support to others (LE)  4/9/2019 Group Therapy 11:00-11:50 Mark checked in and reported that she has noticed an improved mood recently, she has been going on walks with her dog outside, she gave support and feedback to other group members (LE)  4/10/2019 Group Therapy 10:00-10:50 Mark checked in and processed incident with her son last night, she reports feeling proud that she was able to remain calm and that he calmed down within an hour instead of before when it took a few hours. Mark accepted feedback and support from the group, she gave support to other group members (LE)  4/12/2019 Group Therapy 11:00-11:50 Mark checked in and processed feeling scared from the group member who had a seizure, she gave support and feedback to a group member about their substance use (LE)  Dimension Four: Treatment Acceptance / Resistance  Daily Note:4/9/2019 Group Therapy 10:00-10:50 Pt listened to a group members share their autobiography, she gave support and feedback to the group member. Pt would benefit from additional opportunities to practice and implement content from this session (LE)  4/10/2019 Group Therapy 11:00-11:50 Psychotherapist used handout on values and facilitated discussion with group about their values, how they may not be  "living their values due to substance use, how their values have changed over time, and what barriers there are to living their values.  Pt participated in discussion and filling out handout. Pt would benefit from additional opportunities to practice and implement content from this session (LE)    Dimension Five: Continued Use/Relapse Prevention  Daily Note:4/9/2019 Group Therapy 11:00-11:50 moderate risk for relapse (LE)  4/12/2019 ([id number]) Pt attended Relapse Prevention Group where pt reports pts last use as 3/29/19.  Pt denies cravings. Pt was open to the assignment of completing a \"Good-Bye Letter\" to pts substance of choice. Pt was active in group discussion (TARI).    Dimension Six: Recovery Environment  Daily Note:4/8/2019 Group Therapy 11:00-11:50 no change (LE)    Weekly Progress / Treatment Plan Review      Are there additional progress notes for this week? Yes (see additional progress notes)    Are Treatment Plan Goals being addressed? Yes, continue treatment goals    Are treatment plan strategies to address goals effective? Yes, continue treatment strategies    Are there any current contracts in place? Yes. Attendance    Client: na     Client: na    Was client case reviewed with Tom Mullen MD and/or Hyun Boswell MD and the treatment team this week? yes    Psychotherapists contributing to this note:    Group: Psychotherapy Date/Time: 4/8/2019 / 1100 to 1150; Number of Participants: 5  Facilitated by: AVIS Martinez (BRIAN)    Group: Psychotherapy Date/Time: 4/9/2019 / 1000 to 1050; Number of Participants: 4  Facilitated by: AVIS Martinez (BRIAN)    Group: Psychotherapy Date/Time: 4/9/2019 / 1100 to 1150; Number of Participants: 5  Facilitated by: AVIS Martinez (BRIAN)    Group: Psychotherapy Date/Time: 4/10/2019 / 1000 to 1050; Number of Participants: 5  Facilitated by: AVIS Martinez (BRIAN)    Group: Psychotherapy Date/Time: 4/10/2019 / 1100 to 1150; Number of Participants: " 6  Facilitated by: AVIS Martinez (BRIAN)    Group: Relapse Prevention Date/Time: 4/12/2019 / 1000 to 1050; Number of Participants: 7  Facilitated by: Bright Weinstein MA, LP [facility] (TARI)    Group: Psychotherapy Date/Time: 4/12/2019 / 1100 to 1150; Number of Participants: 6  Facilitated by: AVIS Martinez (BRIAN)

## 2019-04-09 ENCOUNTER — HOSPITAL ENCOUNTER (OUTPATIENT)
Dept: BEHAVIORAL HEALTH | Facility: CLINIC | Age: 54
End: 2019-04-09
Attending: PSYCHIATRY & NEUROLOGY
Payer: MEDICARE

## 2019-04-09 PROCEDURE — H2012 BEHAV HLTH DAY TREAT, PER HR: HCPCS

## 2019-04-09 NOTE — PROGRESS NOTES
"Adult Mental Health Outpatient Group Therapy Progress Note     Client Initial Individualized Goals for Treatment:  \"I want to be better, I want to get out and have fun, I need to be around more people, have more friends, be social, live my life.\"        See Initial Treatment suggestions for the client during the time between Diagnostic Assessment and completion of the Master Individualized Treatment Plan.    Treatment Goals:    1.  Brittany will remain sober by going to AA meeting at least 1x per week  2.  Brittany will obtain a sponsor by 3/26/2019  3.  Brittany will learn about her triggers and learn ways to cope with them by not drinking.  1.  Brittany will meet with Dr. uMllen and follow all of his recommendations.  2.  Brittany will learn how to listen to her body and identify how she feels and how to manage her emotions.  3. Brittany will learn and practice coping skills weekly to figure out what works for her.  4.  Brittany will start going to doctor appts and report her attendance weekly, she will follow up as needed.       Area of Treatment Focus:  Symptom Management, Community Resources/Discharge Planning, Abstinence/Relapse Prevention and Develop / Improve Independent Living Skills    Therapeutic Interventions/Treatment Strategies:  Support, Feedback, Structured Activity, Problem Solving, Clarification, Education and Motivational Enhancement Therapy    Response to Treatment Strategies:  Accepted Feedback, Listened, Focused on Goals, Attentive and Accepted Support     Name of Group: Life Skills   Date/Time: 4/9/19 / 12:00 to 12:50    Number of Group Participants: 7    Description and Outcome:  Brittany attended and participated in an experiential Psychoeducation group where rehabilitative intervention focuses on learning, developing through practice, and generalizing independent living skills. The purpose of this group is to stabilize and manage mental health symptoms, reduce/eliminate substance use, and to improve participation and function in " valued roles, routines, relationships. She initiated her technique and followed multi step instructions. She was able to problem solve and focused on how she was able to obtain positive results. She was social with peers and sharing some leisure interests. Client verbalized understanding of session content by noting skills used and positives about herself this date.   Client would benefit from additional opportunities to practice and implement content from this session.    Is this a Weekly Review of the Progress on the Treatment Plan?  No

## 2019-04-10 ENCOUNTER — HOSPITAL ENCOUNTER (OUTPATIENT)
Dept: BEHAVIORAL HEALTH | Facility: CLINIC | Age: 54
End: 2019-04-10
Attending: PSYCHIATRY & NEUROLOGY
Payer: MEDICARE

## 2019-04-10 PROCEDURE — H2012 BEHAV HLTH DAY TREAT, PER HR: HCPCS

## 2019-04-12 ENCOUNTER — HOSPITAL ENCOUNTER (OUTPATIENT)
Dept: BEHAVIORAL HEALTH | Facility: CLINIC | Age: 54
End: 2019-04-12
Attending: PSYCHIATRY & NEUROLOGY
Payer: MEDICARE

## 2019-04-12 PROCEDURE — H2012 BEHAV HLTH DAY TREAT, PER HR: HCPCS

## 2019-04-15 ENCOUNTER — HOSPITAL ENCOUNTER (OUTPATIENT)
Dept: BEHAVIORAL HEALTH | Facility: CLINIC | Age: 54
End: 2019-04-15
Attending: PSYCHIATRY & NEUROLOGY
Payer: MEDICARE

## 2019-04-15 PROCEDURE — H2012 BEHAV HLTH DAY TREAT, PER HR: HCPCS

## 2019-04-15 NOTE — PROGRESS NOTES
"Adult Dual Disorder Progress Note / Treatment Plan Review       Patient: Marissa Saenz   MRN: [id number]  : 1965  Age: 53 year old  Sex: female    Week of: 4/15/2019-2019     Client Initial Individualized Goals for Treatment: \"I want to be better, I want to get out and have fun, I need to be around more people, have more friends, be social, live my life.\"     See Initial Treatment suggestions for the client during the time between Diagnostic Assessment and completion of the Master Individualized Treatment Plan.    Treatment Goals:     1.  Brittany will remain sober by going to AA, NA or Smart Recovery meeting at least 1x per week  2.  Brittany will obtain a sponsor by 2019  3.  Brittany will learn about her triggers and learn ways to cope with them by not drinking.  1.  Brittany will meet with Dr. Mullen and follow all of his recommendations.  2.  Brittany will learn how to listen to her body and identify how she feels and how to manage her emotions.  3. Brittany will learn and practice coping skills weekly to figure out what works for her.  4.  Brittany will start going to doctor and dentist appts and report her attendance weekly, she will follow up as needed.  1.  Brittany will work on boundary setting with her children, prioritizing her needs and recognizing what her children are capable of doing by themselves without her assistance.  She will work on managing her feelings of guilt by reminding herself that she cannot help anyone if she doesn't take care of herself first.      Active Psychiatric Symptoms Impairing:   Mood and Behavior    Area of Treatment Focus:  Symptom Management, Abstinence/Relapse Prevention and Develop Socialization / Interpersonal Relationship Skills    Treatment Strategies:   Support, Redirection, Feedback, Limit/Boundaries, Safety Assessments, Structured Activity, Problem Solving, Clarification, Education, Motivational Enhancement Therapy and Cognitive Behavioral Therapy    Patient Response:  Accepted " Feedback, Gave Feedback, Listened, Focused on Goals, Attentive, Accepted Support and Alert      Dimension Risk Description and Outcome:    Dimension One: Acute Intoxication/Withdrawal Potential   Daily Note:4/17/2019 Group Therapy 10:00-10:50 Pt reports no use (LE)    Dimension Two: Biomedical Conditions and Complications  Daily Note:4/17/2019 Group Therapy 10:00-10:50 Mark reports feeling tired and sick today, but she came anyway because she knows she needs the support (LE)    Dimension Three: Emotional/Behavioral / Cognitive Conditions & Complications  Daily Note:4/15/2019 Group Therapy 11:00-11:50 Mark checked in and reports she was feeling relaxed and happy that she has been working on her patience and has been seeing results, she gave support and feedback to other group members (LE)4/17/2019 Group Therapy 10:00-10:50 Mark checked in and reported that she was feeling tired and overwhelmed with issue with her son, she processed the situation and made a plan for how to proceed.  Pt accepted support and feedback and gave support and feedback to group members.(LE)4/18/2019 Group Therapy 10:00-10:50 Mark checked in and reported that she was feeling more tired and sick again today, she processed how she was able to regulate her emotions yesterday and not get activated by too much stuff going on at home, she gave and received feedback from the group (LE)  4/19/2019 Group Therapy 11:00-11:50 mark checked in and reports that she is feeling powerful and hopeful, she reports being positive and continued work with boundary setting with her daughter, she processed her boundary making and how it has impacted her relationship with her daughter, she accepted support and feedback (LE)    Dimension Four: Treatment Acceptance / Resistance  Daily Note:4/17/2019 Group Therapy 11:00-11:50 Psychotherapist used handout on co-dependency and facilitated discussion with group about co-dependency characteristics and what they recognize in  "themselves.  Pt participated in discussion and filling out handout. Pt would benefit from additional opportunities to practice and implement content from this session (LE)  4/18/2019 Group Therapy 11:00-11:50 Psychotherapist used handout on emotions managment and facilitated discussion with group about the purpose of emotions and how to challenge emotions myths.  Pt participated in discussion.  Pt would benefit from additional opportunities to practice and implement content from this session (LE)    Dimension Five: Continued Use/Relapse Prevention  Daily Note:4/19/2019 Group Therapy 11:00-11:50 moderate risk for relapse (LE) 4/19/2019 Relapse Prevention (10-10:50am) Brittany reports date of last use to be on 3/29/19. She reports experiencing no cravings/urges. Brittany participated in group by sharing/reading her goodbye letter. She shared that she is \"feeling better\" and that she has noticed that since becoming sober, she has begun to feel \"better about myself\". She shared that since becoming sober, she has also recognized she has more time to spend with her kids and grand kids and is glad she is getting to experience things she knows she wouldn't have been able to if she were still drinking. (KJL)    Dimension Six: Recovery Environment  Daily Note:4/17/2019 Group Therapy 10:00-10:50 no change (LE)    Weekly Progress / Treatment Plan Review      Are there additional progress notes for this week? Yes (see additional progress notes)    Are Treatment Plan Goals being addressed? Yes, continue treatment goals    Are treatment plan strategies to address goals effective? Yes, continue treatment strategies    Are there any current contracts in place? Yes. Attendance    Client: na     Client: na    Was client case reviewed with Tom Mullen MD and/or Hyun Boswell MD and the treatment team this week? no    Psychotherapists contributing to this note:    Group: Psychotherapy Date/Time: 4/15/2019 / 1100 to 1150; Number of " Participants: 5  Facilitated by: AVIS Martinez (BRIAN)    Group: Psychotherapy Date/Time: 4/17/2019 / 1000 to 1050; Number of Participants: 5  Facilitated by: AVIS Martinez (BRIAN)    Group: Psychotherapy Date/Time: 4/17/2019 / 1100 to 1150; Number of Participants: 5  Facilitated by: AVIS Martinez (Le)    Group: Psychotherapy Date/Time: 4/18/2019 / 1000 to 1050; Number of Participants: 4  Facilitated by: AVIS Martinez (LE)    Group: Psychotherapy Date/Time: 4/18/2019 / 1100 to 1150; Number of Participants: 4  Facilitated by: AVIS Martinez (Le)    Group: Psychotherapy Date/Time: 4/19/2019 / 1100 to 1150; Number of Participants: 4  Facilitated by: AVIS Martinez (BRIAN)    Group: Relapse Prevention Date/Time: 4/19/2019 / 1000 to 1050; Number of Participants: 3  Facilitated by: IBAN Talamantes Intern ([facility])      Co-Sign: This (diagnostic assessment, individual treatment plan, treatment plan review or progress note) has been reviewed and approved by meMiya MA, WALTER, [facility]  in accordance with the requirements for Clinical Supervision of Outpatient Mental Health Services.  Miya Farris MA, WALTER, B[facility]  4/22/2019

## 2019-04-16 NOTE — PROGRESS NOTES
"Adult Mental Health Outpatient Group Therapy Progress Note     Client Initial Individualized Goals for Treatment:  \"I want to be better, I want to get out and have fun, I need to be around more people, have more friends, be social, live my life.\"        See Initial Treatment suggestions for the client during the time between Diagnostic Assessment and completion of the Master Individualized Treatment Plan.    Treatment Goals:    1.  Brittany will remain sober by going to AA meeting at least 1x per week  2.  Brittany will obtain a sponsor by 3/26/2019  3.  Brittany will learn about her triggers and learn ways to cope with them by not drinking.  1.  Brittany will meet with Dr. Mullen and follow all of his recommendations.  2.  Brittany will learn how to listen to her body and identify how she feels and how to manage her emotions.  3. Brittany will learn and practice coping skills weekly to figure out what works for her.  4.  Brittany will start going to doctor appts and report her attendance weekly, she will follow up as needed.       Area of Treatment Focus:  Symptom Management, Community Resources/Discharge Planning, Abstinence/Relapse Prevention and Develop / Improve Independent Living Skills    Therapeutic Interventions/Treatment Strategies:  Support, Feedback, Structured Activity, Problem Solving, Clarification, Education and Motivational Enhancement Therapy    Response to Treatment Strategies:  Accepted Feedback, Listened, Focused on Goals, Attentive and Accepted Support     Name of Group: Life Skills   Date/Time: 4/15/19 / 10:00 to 10:50    Number of Group Participants: 4    Description and Outcome:  Brittany attended and participated in an experiential Psychoeducation group where rehabilitative intervention focuses on learning, developing through practice, and generalizing independent living skills. The purpose of this group is to stabilize and manage mental health symptoms, reduce/eliminate substance use, and to improve participation and function in " "valued roles, routines, relationships. Topic for group was learning about self regulation and the role of sensory input and use of sensory modulation techniques for increase the ability to modify, change, or manage ones  level of arousal or alertness. Focus is to learn about the internal and external senses and the specific types of techniques that can help in calming, alerting, or grounding oneself in order to manage and then to return to functioning and thinking more clearly. Also included is opportunity to try several kinds of the techniques and to begin to explore which ones can be useful to try to practice at home. Also included is identifying how chemical use has been a part of one s management tools and ways to begin to work on using these techniques to help in self regulation. She was engaged in the discussion, focusing most of her comments on her son and what he does. She also initially reported that she does not engage in any technique that uses her senses for self regulation. Upon further assistance, she was able to note what her dog does when she is more upset or \"stressed\". As staff noted what things were sensory input for herself and how this was calming for her, she noted that she had not been aware of this for herself. She was open to try a variety of the techniques, including the weighted materials. Client verbalized understanding of session content by noting increased awareness of some skills she was using and identifying some new things she could try for herself. Client would benefit from additional opportunities to practice and implement content from this session.    Is this a Weekly Review of the Progress on the Treatment Plan?  No  "

## 2019-04-17 ENCOUNTER — HOSPITAL ENCOUNTER (OUTPATIENT)
Dept: BEHAVIORAL HEALTH | Facility: CLINIC | Age: 54
End: 2019-04-17
Attending: PSYCHIATRY & NEUROLOGY
Payer: MEDICARE

## 2019-04-17 PROCEDURE — H2012 BEHAV HLTH DAY TREAT, PER HR: HCPCS

## 2019-04-18 ENCOUNTER — HOSPITAL ENCOUNTER (OUTPATIENT)
Dept: BEHAVIORAL HEALTH | Facility: CLINIC | Age: 54
End: 2019-04-18
Attending: PSYCHIATRY & NEUROLOGY
Payer: MEDICARE

## 2019-04-18 PROCEDURE — H2012 BEHAV HLTH DAY TREAT, PER HR: HCPCS

## 2019-04-18 NOTE — PROGRESS NOTES
"Adult Mental Health Outpatient Group Therapy Progress Note     Client Initial Individualized Goals for Treatment:  \"I want to be better, I want to get out and have fun, I need to be around more people, have more friends, be social, live my life.\"        See Initial Treatment suggestions for the client during the time between Diagnostic Assessment and completion of the Master Individualized Treatment Plan.    Treatment Goals:    1.  Brittany will remain sober by going to AA meeting at least 1x per week  2.  Brittany will obtain a sponsor by 3/26/2019  3.  Brittany will learn about her triggers and learn ways to cope with them by not drinking.  1.  Brittany will meet with Dr. Mullen and follow all of his recommendations.  2.  Brittany will learn how to listen to her body and identify how she feels and how to manage her emotions.  3. Brittany will learn and practice coping skills weekly to figure out what works for her.  4.  Brittany will start going to doctor appts and report her attendance weekly, she will follow up as needed.       Area of Treatment Focus:  Symptom Management, Community Resources/Discharge Planning, Abstinence/Relapse Prevention and Develop / Improve Independent Living Skills    Therapeutic Interventions/Treatment Strategies:  Support, Feedback, Structured Activity, Problem Solving, Clarification, Education and Motivational Enhancement Therapy    Response to Treatment Strategies:  Accepted Feedback, Listened, Focused on Goals, Attentive and Accepted Support     Name of Group: Life Skills   Date/Time: 4/18/19 / 12:00 to 12:50    Number of Group Participants: 4    Description and Outcome:  Brittany attended and participated in an experiential Psychoeducation group where rehabilitative intervention focuses on learning, developing through practice, and generalizing independent living skills. The purpose of this group is to stabilize and manage mental health symptoms, reduce/eliminate substance use, and to improve participation and function in " valued roles, routines, relationships. She easily initiated her technique and was able to problem solve independently. She more easily focused on positives for herself. Client verbalized understanding of session content by noting how engagement in her work is a positive coping skills for herself. She focused on additional things she could try at home for management.  Client would benefit from additional opportunities to practice and implement content from this session.    Is this a Weekly Review of the Progress on the Treatment Plan?  No

## 2019-04-19 ENCOUNTER — HOSPITAL ENCOUNTER (OUTPATIENT)
Dept: BEHAVIORAL HEALTH | Facility: CLINIC | Age: 54
End: 2019-04-19
Attending: PSYCHIATRY & NEUROLOGY
Payer: MEDICARE

## 2019-04-19 PROCEDURE — H2012 BEHAV HLTH DAY TREAT, PER HR: HCPCS

## 2019-04-22 ENCOUNTER — HOSPITAL ENCOUNTER (OUTPATIENT)
Dept: BEHAVIORAL HEALTH | Facility: CLINIC | Age: 54
End: 2019-04-22
Attending: PSYCHIATRY & NEUROLOGY
Payer: MEDICARE

## 2019-04-22 PROCEDURE — H2012 BEHAV HLTH DAY TREAT, PER HR: HCPCS

## 2019-04-22 NOTE — PROGRESS NOTES
"Adult Mental Health Outpatient Group Therapy Progress Note     Client Initial Individualized Goals for Treatment:  \"I want to be better, I want to get out and have fun, I need to be around more people, have more friends, be social, live my life.\"        See Initial Treatment suggestions for the client during the time between Diagnostic Assessment and completion of the Master Individualized Treatment Plan.    Treatment Goals:    1.  Brittany will remain sober by going to AA meeting at least 1x per week  2.  Brittany will obtain a sponsor by 3/26/2019  3.  Brittany will learn about her triggers and learn ways to cope with them by not drinking.  1.  Brittany will meet with Dr. Mullen and follow all of his recommendations.  2.  Brittany will learn how to listen to her body and identify how she feels and how to manage her emotions.  3. Brittany will learn and practice coping skills weekly to figure out what works for her.  4.  Brittany will start going to doctor appts and report her attendance weekly, she will follow up as needed.       Area of Treatment Focus:  Symptom Management, Community Resources/Discharge Planning, Abstinence/Relapse Prevention and Develop / Improve Independent Living Skills    Therapeutic Interventions/Treatment Strategies:  Support, Feedback, Structured Activity, Problem Solving, Clarification, Education and Motivational Enhancement Therapy    Response to Treatment Strategies:  Accepted Feedback, Listened, Focused on Goals, Attentive and Accepted Support     Name of Group: Life Skills   Date/Time: 4/22/19 / 12:00 to 12:50    Number of Group Participants: 6    Description and Outcome:  Brittany attended and participated in an experiential Psychoeducation group where rehabilitative intervention focuses on learning, developing through practice, and generalizing independent living skills. The purpose of this group is to stabilize and manage mental health symptoms, reduce/eliminate substance use, and to improve participation and function in " valued roles, routines, relationships. She easily focused on her technique and was bale to engage in planning and problem solving to completion. She noted positives results and was supportive of peer to try the same. She was able to recall the instructions and was supporting the peer in having the skills to be able to do it, noting that she did note think she had them initially and was successful with this.  Client verbalized understanding of session content by focusing on positive benefits for herself. She noted that she is now discovering posiitve activities to engage in that provide her with relaxation and enjoyment, along with stress management to begin to utilize at home.  Client would benefit from additional opportunities to practice and implement content from this session.    Is this a Weekly Review of the Progress on the Treatment Plan?  No

## 2019-04-22 NOTE — PROGRESS NOTES
"Adult Dual Disorder Progress Note / Treatment Plan Review       Patient: Marissa Saenz   MRN: [id number]  : 1965  Age: 53 year old  Sex: female    Week of: 2019-2019     Client Initial Individualized Goals for Treatment: \"I want to be better, I want to get out and have fun, I need to be around more people, have more friends, be social, live my life.\"     See Initial Treatment suggestions for the client during the time between Diagnostic Assessment and completion of the Master Individualized Treatment Plan.    Treatment Goals:     1.  Brittany will remain sober by going to AA, NA or Smart Recovery meeting at least 1x per week  2.  Brittany will obtain a sponsor by 2019  3.  Brittany will learn about her triggers and learn ways to cope with them by not drinking.  1.  Brittany will meet with Dr. Mullen and follow all of his recommendations.  2.  Brittany will learn how to listen to her body and identify how she feels and how to manage her emotions.  3. Brittany will learn and practice coping skills weekly to figure out what works for her.  4.  Brittany will start going to doctor and dentist appts and report her attendance weekly, she will follow up as needed.  1.  Brittany will work on boundary setting with her children, prioritizing her needs and recognizing what her children are capable of doing by themselves without her assistance.  She will work on managing her feelings of guilt by reminding herself that she cannot help anyone if she doesn't take care of herself first.    Active Psychiatric Symptoms Impairing:   Mood    Area of Treatment Focus:  Symptom Management, Abstinence/Relapse Prevention and Develop Socialization / Interpersonal Relationship Skills    Treatment Strategies:   Support, Redirection, Feedback, Limit/Boundaries, Safety Assessments, Structured Activity, Problem Solving, Clarification, Education, Motivational Enhancement Therapy and Cognitive Behavioral Therapy    Patient Response:  Accepted Feedback, Gave " Feedback, Listened, Focused on Goals, Attentive, Accepted Support and Alert      Dimension Risk Description and Outcome:    Dimension One: Acute Intoxication/Withdrawal Potential   Daily Note:4/23/2019 Group Therapy 11:00-11:50 pt reports no alcohol use (LE)    Dimension Two: Biomedical Conditions and Complications  Daily Note:4/23/2019 Group Therapy 11:00-11:50 pt reports feeling ill, she was observed having a runny nose and sneezing (LE)    Dimension Three: Emotional/Behavioral / Cognitive Conditions & Complications  Daily Note:4/22/2019 Group Therapy 11:00-11:50 mark checked in and reported that she was feeling sucessful for not drinking this weekend, she processed how she did 3 things this weekend that she usually drinks while doing, she reports being proud of her management of triggers, pt gave support and feedback to the group (LE)  4/23/2019 Group Therapy 11:00-11:50 Mark checked in and reported that she was feeling nervous and worried about the school appointment today with her son, she processed what she plans on doing today, she accepted feedback and support and gave support to others (LE)  4/25/2019 Group Therapy 10:00-10:50 Mark checked in and reported feeling not well due to allergies, she processed how she is proud about how her meeting went on Tuesday and her progress in the program, she accepted support and feedback from the group. (LE)  Dimension Four: Treatment Acceptance / Resistance  Daily Note:4/23/2019 Group Therapy 10:00-10:50 Group member shared their autobiography, Pt was supportive and provided effective feedback to group member.  Pt would benefit from additional opportunities to practice and implement content from this session (LE)  4/25/2019 Group Therapy 11:00-11:50 Mental Health Professional facilitated discussion with group about cognitive distortions and how to catch and change maladaptive thinking.  Pt participated in discussion and provided examples of how their thoughts can be  changed.  Pt would benefit from additional opportunities to practice and implement content from this session (BRIAN)  Dimension Five: Continued Use/Relapse Prevention  Daily Note:    Dimension Six: Recovery Environment  Daily Note:4/23/2019 Group Therapy 11:00-11:50 no change (LE)    Weekly Progress / Treatment Plan Review      Are there additional progress notes for this week? Yes (see additional progress notes)    Are Treatment Plan Goals being addressed? Yes, continue treatment goals    Are treatment plan strategies to address goals effective? Yes, continue treatment strategies    Are there any current contracts in place? No    Client: na     Client: na    Was client case reviewed with Tom Mullen MD and/or Hyun Boswell MD and the treatment team this week? yes    Psychotherapists contributing to this note:    Group: Psychotherapy Date/Time: 4/22/2019 / 1100 to 1150; Number of Participants: 7  Facilitated by: AVIS Martinez (BRIAN)    Group: Psychotherapy Date/Time: 4/23/2019 / 1000 to 1050; Number of Participants: 4  Facilitated by: AVIS Martinez (BRIAN)    Group: Psychotherapy Date/Time: 4/23/2019 / 1100 to 1150; Number of Participants: 6  Facilitated by: AVIS Martinez (BRIAN)    Group: Psychotherapy Date/Time: 4/25/2019 / 1000 to 1050; Number of Participants: 7  Facilitated by: AVIS Martinez (BRIAN)    Group: Psychotherapy Date/Time: 4/25/2019 / 1100 to 1150; Number of Participants: 8  Facilitated by: AVIS Martinez (BRIAN)

## 2019-04-23 ENCOUNTER — HOSPITAL ENCOUNTER (OUTPATIENT)
Dept: BEHAVIORAL HEALTH | Facility: CLINIC | Age: 54
End: 2019-04-23
Attending: PSYCHIATRY & NEUROLOGY
Payer: MEDICARE

## 2019-04-23 PROCEDURE — H2012 BEHAV HLTH DAY TREAT, PER HR: HCPCS

## 2019-04-23 PROCEDURE — 99214 OFFICE O/P EST MOD 30 MIN: CPT | Performed by: PSYCHIATRY & NEUROLOGY

## 2019-04-23 RX ORDER — BUSPIRONE HYDROCHLORIDE 10 MG/1
10 TABLET ORAL 3 TIMES DAILY
Qty: 90 TABLET | Refills: 3 | Status: SHIPPED | OUTPATIENT
Start: 2019-04-23

## 2019-04-23 RX ORDER — CITALOPRAM HYDROBROMIDE 20 MG/1
20 TABLET ORAL DAILY
Qty: 30 TABLET | Refills: 3 | Status: SHIPPED | OUTPATIENT
Start: 2019-04-23

## 2019-04-23 NOTE — PROGRESS NOTES
[facility], [place]   Psychiatric Progress Note        Interim History:   The patient's care was discussed with the treatment team during the weekly team meeting and/or staff's chart notes were reviewed.  Staff report patient has been more anxious, had to stop Elavil d/t restless legs.  Pt says her mood remains fairly good but anxiety has been very high, causing poor sleep and distractability.  She says her daughter does well on Celexa and Buspar and pt has never been on these.  After discussion of the indications, risks, benefits, alternatives and consequences of no treatment, the patient elects to start Celexa and Buspar for anxiety.  She denies having any SI, denies having alcohol cravings, does not want a medicine for alcohol abstinence.  The patient had no further complaints or requests.          Psychiatric Examination:            Appearance: alert  Attitude:  cooperative  Eye Contact:  fair  Mood: less depressed, more anxious  Affect:  Less constricted  Speech:  Less delayed and slow  Psychomotor Behavior:  no evidence of tardive dyskinesia, dystonia, or tics and intact station, gait and muscle tone  Throught Process:  logical and linear  Associations:  no loose associations  Thought Content:  no evidence of psychotic thought, no SI/HI  Insight:  fair  Judgement:  Intact  Language: Appropriate  Fund of Knowledge: Average  Oriented to:  time, person, and place  Attention Span and Concentration:  intact  Recent and Remote Memory:  intact                 DIagnoses:     Axis I:   1.  Major depressive disorder, recurrent, moderate.   2.  Generalized anxiety disorder.   3.  Alcohol use disorder, severe, in early remission.   4.  Attention deficit hyperactivity disorder by history.   Axis II:  Deferred.   Axis III:  Chronic knee back and joint pain as well as seasonal allergies.   Axis IV:  Stressors include recurrent problems with mood, anxiety and sleep.  She has abused alcohol in  order to sleep for many years.  Strengths include some family support, fairly good insight, willingness to engage in treatment, willingness to work on gaining coping skills.            Plan:     Discontinue Elavil d/t intolerable SE (restless legs affecting sleep)  Start Celexa 20 mg daily for depression/anxiety  Start Buspar 10 mg TID for anxiety and augmentation of Celexa    Pt will f/u with me in 1 month.  Pt will stay in the [facility] Day Tx program until she can start the mental health Day Tx program (3 days per week), in the next week or so.

## 2019-04-23 NOTE — PROGRESS NOTES
"Adult Mental Health Outpatient Group Therapy Progress Note     Client Initial Individualized Goals for Treatment:  \"I want to be better, I want to get out and have fun, I need to be around more people, have more friends, be social, live my life.\"        See Initial Treatment suggestions for the client during the time between Diagnostic Assessment and completion of the Master Individualized Treatment Plan.    Treatment Goals:    1.  Brittany will remain sober by going to AA meeting at least 1x per week  2.  Brittany will obtain a sponsor by 3/26/2019  3.  Brittany will learn about her triggers and learn ways to cope with them by not drinking.  1.  Brittany will meet with Dr. Mullen and follow all of his recommendations.  2.  Brittany will learn how to listen to her body and identify how she feels and how to manage her emotions.  3. Brittany will learn and practice coping skills weekly to figure out what works for her.  4.  Brittany will start going to doctor appts and report her attendance weekly, she will follow up as needed.       Area of Treatment Focus:  Symptom Management, Community Resources/Discharge Planning, Abstinence/Relapse Prevention and Develop / Improve Independent Living Skills    Therapeutic Interventions/Treatment Strategies:  Support, Feedback, Structured Activity, Problem Solving, Clarification, Education and Motivational Enhancement Therapy    Response to Treatment Strategies:  Accepted Feedback, Listened, Focused on Goals, Attentive and Accepted Support     Name of Group: Life Skills   Date/Time: 4/23/19 / 12:00 to 12:50    Number of Group Participants: 5    Description and Outcome:  Brittany attended and participated in an experiential Psychoeducation group where rehabilitative intervention focuses on learning, developing through practice, and generalizing independent living skills. The purpose of this group is to stabilize and manage mental health symptoms, reduce/eliminate substance use, and to improve participation and function in " valued roles, routines, relationships. She easily focused on her technique and began first steps. She was supportive to peer trying a similar technique and showing her steps to complete, also. Client verbalized understanding of session content by noting benefits for herself with symptom management and building self confidence with her technique.  Client would benefit from additional opportunities to practice and implement content from this session.    Is this a Weekly Review of the Progress on the Treatment Plan?  No

## 2019-04-25 ENCOUNTER — HOSPITAL ENCOUNTER (OUTPATIENT)
Dept: BEHAVIORAL HEALTH | Facility: CLINIC | Age: 54
End: 2019-04-25
Attending: PSYCHIATRY & NEUROLOGY
Payer: MEDICARE

## 2019-04-25 PROCEDURE — H2012 BEHAV HLTH DAY TREAT, PER HR: HCPCS

## 2019-04-25 NOTE — PROGRESS NOTES
"Adult Mental Health Outpatient Group Therapy Progress Note     Client Initial Individualized Goals for Treatment:  \"I want to be better, I want to get out and have fun, I need to be around more people, have more friends, be social, live my life.\"        See Initial Treatment suggestions for the client during the time between Diagnostic Assessment and completion of the Master Individualized Treatment Plan.    Treatment Goals:    1.  Brittany will remain sober by going to AA meeting at least 1x per week  2.  Brittany will obtain a sponsor by 3/26/2019  3.  Brittany will learn about her triggers and learn ways to cope with them by not drinking.  1.  Brittany will meet with Dr. Mullen and follow all of his recommendations.  2.  Brittany will learn how to listen to her body and identify how she feels and how to manage her emotions.  3. Brittany will learn and practice coping skills weekly to figure out what works for her.  4.  Brittany will start going to doctor appts and report her attendance weekly, she will follow up as needed.       Area of Treatment Focus:  Symptom Management, Community Resources/Discharge Planning, Abstinence/Relapse Prevention and Develop / Improve Independent Living Skills    Therapeutic Interventions/Treatment Strategies:  Support, Feedback, Structured Activity, Problem Solving, Clarification, Education and Motivational Enhancement Therapy    Response to Treatment Strategies:  Accepted Feedback, Listened, Focused on Goals, Attentive and Accepted Support     Name of Group: Life Skills   Date/Time: 4/25/19 / 12:00 to 12:50    Number of Group Participants: 8    Description and Outcome:  Brittany attended and participated in an experiential Psychoeducation group where rehabilitative intervention focuses on learning, developing through practice, and generalizing independent living skills. The purpose of this group is to stabilize and manage mental health symptoms, reduce/eliminate substance use, and to improve participation and function in " valued roles, routines, relationships. Topic for the group was focused on stress management and the use of negative and positive coping skills for management. Education was provided re: definition of this and looking specifically and individually at both negative coping skills and the  high cost  or negative consequences of using these types of skills, along with increasing awareness of the positive coping skills that are available to use. Clients identified specific ones for each of these categories and strategies they can use to increase their use of positive coping skills, focusing both on sobriety and mental health. She was active in the discussion and able to note examples of each type for herself. She was able to identify new skills to try for management, also. Client verbalized understanding of session content by focusing on skills and benefits that she is learning for herself and things she is learning about herself and what is important to her.  Client would benefit from additional opportunities to practice and implement content from this session.    Is this a Weekly Review of the Progress on the Treatment Plan?  No

## 2019-04-29 ENCOUNTER — HOSPITAL ENCOUNTER (OUTPATIENT)
Dept: BEHAVIORAL HEALTH | Facility: CLINIC | Age: 54
End: 2019-04-29
Attending: PSYCHIATRY & NEUROLOGY
Payer: MEDICARE

## 2019-04-29 PROCEDURE — H2012 BEHAV HLTH DAY TREAT, PER HR: HCPCS

## 2019-04-29 NOTE — PROGRESS NOTES
"Adult Dual Disorder Progress Note / Treatment Plan Review       Patient: Marissa Saenz   MRN: [id number]  : 1965  Age: 53 year old  Sex: female    Week of: 2019-5/3/2019     Client Initial Individualized Goals for Treatment: \"I want to be better, I want to get out and have fun, I need to be around more people, have more friends, be social, live my life.\"     See Initial Treatment suggestions for the client during the time between Diagnostic Assessment and completion of the Master Individualized Treatment Plan.    Treatment Goals:     1.  Brittany will remain sober by going to AA, NA or Smart Recovery meeting at least 1x per week  2.  Brittany will obtain a sponsor by 2019  3.  Brittany will learn about her triggers and learn ways to cope with them by not drinking.  1.  Brittany will meet with Dr. Mullen and follow all of his recommendations.  2.  Brittany will learn how to listen to her body and identify how she feels and how to manage her emotions.  3. Brittany will learn and practice coping skills weekly to figure out what works for her.  4.  Brittany will start going to doctor and dentist appts and report her attendance weekly, she will follow up as needed.  1.  Brittany will work on boundary setting with her children, prioritizing her needs and recognizing what her children are capable of doing by themselves without her assistance.  She will work on managing her feelings of guilt by reminding herself that she cannot help anyone if she doesn't take care of herself first.    Active Psychiatric Symptoms Impairing:   Mood    Area of Treatment Focus:  Symptom Management, Abstinence/Relapse Prevention and Develop Socialization / Interpersonal Relationship Skills    Treatment Strategies:   Support, Redirection, Feedback, Limit/Boundaries, Safety Assessments, Structured Activity, Problem Solving, Clarification, Education, Motivational Enhancement Therapy and Cognitive Behavioral Therapy    Patient Response:  Accepted Feedback, Gave " "Feedback, Listened, Focused on Goals, Attentive, Accepted Support and Alert      Dimension Risk Description and Outcome:    Dimension One: Acute Intoxication/Withdrawal Potential   Daily Note:4/29/2019 Group Therapy 11:00-11:50 pt reports continued sobriety (LE) 5/2/19 (10:00am - 10:50am - Psychotherapy): Brittany reported continued sobriety and no withdrawal concerns. (ARS)    Dimension Two: Biomedical Conditions and Complications  Daily Note:4/29/2019 Group Therapy 11:00-11:50 pt reports continued allergies and runny nose (LE) (10:00am - 10:50am - Psychotherapy): No changes. (ARS)    Dimension Three: Emotional/Behavioral / Cognitive Conditions & Complications  Daily Note:4/29/2019 Group Therapy 11:00-11:50 Brittany checked in and reported feeling nervous and excited and she goes down to 3 days this week as she prepares for discharge on Friday, she reports continued work on management of her emotions and taking the time to be mindful and figure out what is going on with herself.  Pt denied SI.   Pt gave support and feedback to group members (LE) (10:00am - 10:50am - Psychotherapy): Brittany reported no suicidal ideation or self-injurious behaviors and is taking her medication as prescribed.  She reported feeling \"overwhelmed\" and \"tired.\"  She identifed that her goal for the day is to \"stay sober.\"  She reported that she has to work this weekend and feels overwhelmed by all she has to do but it going to try to take things one at at time. She also reported feeling anxious about transitioning to Day Treatment because she will be in a group with unfamiliar people. (ARS)  5/2/19 (11:00am - 11:50am - Psychotherapy - Interpersonal Relationships): Brittany and group members discussed the definition of assertiveness.  Group members reviewed a handout describing 4 steps to assertive behaviors.  Group members discussed challenges they have with being assertive and how improving these skills could be beneficial to their recover and " "relationships.  Group members were asked to write down an assertive response to a problem they had/have/will have and they shared these responses with the group.  Brittany shared and reflected on how her non-verbal communication often comes off as aggressive when that is not her intent. (ARS)  5/3/2019 Group Therapy 11:00-11:50 Brittany checked in and reported feeling nervous and excited she is going to day treatment next week and is working on preparing herself, she processed how she struggled with setting boundaries yesterday and was encouraged to remember that it is something that takes time, people will continually test her boundaries.  Pt denied SI.   Pt accepted support and feedback and gave support and feedback to group members (LE)  Dimension Four: Treatment Acceptance / Resistance  Daily Note:4/29/2019 Group Therapy 10:00-10:50 Mental Health Professional facilitated discussion with group about self-compassion and used the handout  3 strategies to practice self-compassion .  Pt participated in discussion and provided examples of how they struggle with self-compassion and how it would change their lives.  Pt would benefit from additional opportunities to practice and implement content from this session (LE) (10:00am - 10:50am - Psychotherapy): Brittany was engaged in psychotherapy and offered feedback and support to her peers. (ARS)    Dimension Five: Continued Use/Relapse Prevention  Daily Note: (10:00am - 10:50am - Psychotherapy): Brittany did not report any recent urges or cravings but is anxious about leaving the Dual Disorder Program and transitioning to Day Treatment. (ARS) 5/3/2019 ([id number]) Pt reports her last use as 2/24/19. Pt denies urges to use. Pt was active during discussion on the topic of \"triggers to use\" (TARI).     Dimension Six: Recovery Environment  Daily Note:4/29/2019 Group Therapy 11:00-11:50 no change (LE) (10:00am - 10:50am - Psychotherapy): No changes. (ARS)    Weekly Progress / Treatment Plan Review "      Are there additional progress notes for this week? Yes (see additional progress notes)    Are Treatment Plan Goals being addressed? Client discharged    Are treatment plan strategies to address goals effective? Client discharged    Are there any current contracts in place? No    Client: na     Client: na    Was client case reviewed with Tom Mullen MD and/or Hyun Boswell MD and the treatment team this week? yes    Psychotherapists contributing to this note:    Group: Psychotherapy Date/Time: 4/29/2019 / 1000 to 1050; Number of Participants: 6  Facilitated by: AVIS Martinez (BRIAN)    Group: Psychotherapy Date/Time: 4/29/2019 / 1100 to 1150; Number of Participants: 6  Facilitated by: AVIS Martinez (BRIAN)    Group: Psychotherapy Date/Time: 5/2/19 10:00am - 10:50am; Number of Participants: 6    Facilitated by: EDV Olsen LADC, LPCC (ARS)    Group: Psychotherapy - Interpersonal Relationships Date/Time: 5/2/19 11:00am - 11:50am; Number of Participants: 6    Facilitated by: DEV Olsen LADC, WALTER (ARS)    Group: Psychotherapy Date/Time: 5/3/2019 / 1000 to 1050; Number of Participants: 5  Facilitated by:  Bright Weinstein MA, LP [facility]    Group: Psychotherapy Date/Time: 5/3/2019 / 1100 to 1150; Number of Participants: 5  Facilitated by: AVIS Martinez (Brian)

## 2019-04-30 NOTE — PROGRESS NOTES
"Adult Mental Health Outpatient Group Therapy Progress Note     Client Initial Individualized Goals for Treatment:  \"I want to be better, I want to get out and have fun, I need to be around more people, have more friends, be social, live my life.\"        See Initial Treatment suggestions for the client during the time between Diagnostic Assessment and completion of the Master Individualized Treatment Plan.    Treatment Goals:    1.  Brittany will remain sober by going to AA meeting at least 1x per week  2.  Brittany will obtain a sponsor by 3/26/2019  3.  Brittany will learn about her triggers and learn ways to cope with them by not drinking.  1.  Brittayn will meet with Dr. Mullen and follow all of his recommendations.  2.  Brittany will learn how to listen to her body and identify how she feels and how to manage her emotions.  3. Brittany will learn and practice coping skills weekly to figure out what works for her.  4.  Brittany will start going to doctor appts and report her attendance weekly, she will follow up as needed.       Area of Treatment Focus:  Symptom Management, Community Resources/Discharge Planning, Abstinence/Relapse Prevention and Develop / Improve Independent Living Skills    Therapeutic Interventions/Treatment Strategies:  Support, Feedback, Structured Activity, Problem Solving, Clarification, Education and Motivational Enhancement Therapy    Response to Treatment Strategies:  Accepted Feedback, Listened, Focused on Goals, Attentive and Accepted Support     Name of Group: Life Skills   Date/Time: 4/29/19 / 12:00 to 12:50    Number of Group Participants: 6    Description and Outcome:  Brittany attended and participated in an experiential Psychoeducation group where rehabilitative intervention focuses on learning, developing through practice, and generalizing independent living skills. The purpose of this group is to stabilize and manage mental health symptoms, reduce/eliminate substance use, and to improve participation and function in " valued roles, routines, relationships. She easily engaged independently with her practice. She was able to complete planning and follow through and focus on positive results. Client verbalized understanding of session content by focus on learning new things and how it improves self confidence.  Client would benefit from additional opportunities to practice and implement content from this session.    Is this a Weekly Review of the Progress on the Treatment Plan?  No

## 2019-05-02 ENCOUNTER — HOSPITAL ENCOUNTER (OUTPATIENT)
Dept: BEHAVIORAL HEALTH | Facility: CLINIC | Age: 54
End: 2019-05-02
Attending: PSYCHIATRY & NEUROLOGY
Payer: MEDICARE

## 2019-05-02 PROCEDURE — H2012 BEHAV HLTH DAY TREAT, PER HR: HCPCS

## 2019-05-02 NOTE — PROGRESS NOTES
"Adult Mental Health Outpatient Group Therapy Progress Note     Client Initial Individualized Goals for Treatment:  \"I want to be better, I want to get out and have fun, I need to be around more people, have more friends, be social, live my life.\"        See Initial Treatment suggestions for the client during the time between Diagnostic Assessment and completion of the Master Individualized Treatment Plan.    Treatment Goals:    1.  Brittany will remain sober by going to AA meeting at least 1x per week  2.  Brittany will obtain a sponsor by 3/26/2019  3.  Brittany will learn about her triggers and learn ways to cope with them by not drinking.  1.  Brittany will meet with Dr. Mullen and follow all of his recommendations.  2.  Brittany will learn how to listen to her body and identify how she feels and how to manage her emotions.  3. Brittany will learn and practice coping skills weekly to figure out what works for her.  4.  Brittany will start going to doctor appts and report her attendance weekly, she will follow up as needed.       Area of Treatment Focus:  Symptom Management, Community Resources/Discharge Planning, Abstinence/Relapse Prevention and Develop / Improve Independent Living Skills    Therapeutic Interventions/Treatment Strategies:  Support, Feedback, Structured Activity, Problem Solving, Clarification, Education and Motivational Enhancement Therapy    Response to Treatment Strategies:  Accepted Feedback, Listened, Focused on Goals, Attentive and Accepted Support     Name of Group: Life Skills   Date/Time: 5/2/19 / 12:00 to 12:50    Number of Group Participants: 4    Description and Outcome:  Brittany attended and participated in an experiential Psychoeducation group where rehabilitative intervention focuses on learning, developing through practice, and generalizing independent living skills. The purpose of this group is to stabilize and manage mental health symptoms, reduce/eliminate substance use, and to improve participation and function in " valued roles, routines, relationships. Topic for the session was to begin to develop a relapse management kit for both mental and chemical health by creating individualized coping cards. Suggested categories were offered to work on such as triggers, warning signs, coping skills, and other types of ways to manage. She was focused for some time periods, but did note need to stand and move around as she was having more physical discomfort this date. She was able to note some categories and list a few skills and items of warning signs for herself on her own set of cards. Client verbalized understanding of session content by noting benefits of having written skills to look at for management for herself.      Is this a Weekly Review of the Progress on the Treatment Plan?  Yes. This is last Life Skills group for client prior to discharge tomorrow. She will be attending Day Treatment and will coordinate with staff re: potential treatment goals to continue to work on.

## 2019-05-03 ENCOUNTER — HOSPITAL ENCOUNTER (OUTPATIENT)
Dept: BEHAVIORAL HEALTH | Facility: CLINIC | Age: 54
End: 2019-05-03
Attending: PSYCHIATRY & NEUROLOGY
Payer: MEDICARE

## 2019-05-03 PROCEDURE — H2012 BEHAV HLTH DAY TREAT, PER HR: HCPCS

## 2019-05-03 NOTE — DISCHARGE SUMMARY
Adult Dual Disorder Program Discharge Summary / Instructions     Patient: Marissa Saenz MRN: [id number]  : 1965 Age:  53 year old Sex:  female    Admission Date: 3/4/2019  Discharge Date: 5/3/2019    Reason for Discharge: Completed treatment               Prognosis: Good      Client Progress Toward AchievingTreatment Plan Goals / Dimensions Risk Scale       Marissa attended 95 of 132 scheduled MICD groups. Absences were due to sickness, transportation, weather, babysitting grandchildren.      Diagnosis:   296.40 Bipolar I Disorder Current or Most Recent Episode Hypomanic  300.22 (F40.00) Agoraphobia  303.90 (f10.20) Alcohol Use Disorder, Severe  Hx of ADHD    Individualized Treatment Plan Goals/Progress:       Goal:  Target Date: 2019 Status: COMPLETED  1.  Brittany will work on boundary setting with her children, prioritizing her needs and recognizing what her children are capable of doing by themselves without her assistance.  She will work on managing her feelings of guilt by reminding herself that she cannot help anyone if she doesn't take care of herself first.      Progress:   5/3/19: Brittany has begun to work on boundary setting with her children and focusing on meeting her needs. She would benefit from continued work in this area in the Day Treatment program.       Goal:  Target Date: 2019 Status: COMPLETED  1.  Brittany will meet with Dr. Mullen and follow all of his recommendations.  2.  Brittany will learn how to listen to her body and identify how she feels and how to manage her emotions.  3. Brittany will learn and practice coping skills weekly to figure out what works for her.  4.  Brittany will start going to doctor and dentist appts and report her attendance weekly, she will follow up as needed.    Progress:Brittany has been working on listening to her body and has noticed an improvement in mindfulness and reactions, she would like to continue the goals.   5/3/19: Brittany continued to work on these goals and  completed them. It is recommended that she continue work on emotion management skills, focusing on active practice of using skills, including things that provide her with interest, distraction, and relaxation.      Goal:  Target Date: 5/7/2019 Status: STOPPED  1.  Brittany will remain sober by going to AA, NA or Smart Recovery meeting at least 1x per week  2.  Brittany will obtain a sponsor by 4/26/2019  3.  Brittany will learn about her triggers and learn ways to cope with them by not drinking.      Progress: Brittany reports that she still has not obtained a sponsor, she wants to add a goal for looking into NA and smart recovery as well as AA to decide what is the best fit for her.  She would like to continue her goals    5/3/19: Brittany did not follow through with going to any sober support meetings or getting a sponsor. It is recommended that she continue to work on ways to get sober support. She was open to learn about her triggers and participated in groups learning about skills to use for management instead of drinking.                  Admit Discharge   PHQ-9 12 12   KELI-7 10 10   CGI 4 4       DIMENSION  ADMIT RATING DISCHARGE RATING   1 Acute Intoxication / Withdrawal Potential 0 0   2 Biomedical Conditions & Complications 1 0   3 Emotional / Behavioral / Cognitive Conditions & Complications 2 1   4 Treatment Acceptance / Resistance: 1 1   5 Continued Use / Relapse Prevention 3 2   6 Recovery Environment 1 1       Additional Comments: Brittany is transferring to Day Treatment, the 4Bs T,W,F 9am-12pm    Completed By: AVIS Martinez

## 2019-05-03 NOTE — DISCHARGE SUMMARY
Adult Dual Disorder Program   Discharge Summary/Instructions     Patient: Marissa Saenz MRN: [id number]  : 1965 Age:  53 year old Sex:  female    Admission Date: 3/4/2019  Discharge Date: 5/3/2019  Diagnosis: 296.40 Bipolar I Disorder Current or Most Recent Episode Hypomanic  300.22 (F40.00) Agoraphobia  303.90 (f10.20) Alcohol Use Disorder, Severe  Hx of ADHD    Focus of Treatment / Discharge Recommendations: Please continue to practice your coping skills to manage your symptoms of anxiety and activation.  Attend Day Treatment programming.    Personal Safety/ Management of Symptoms:    * Follow your safety plan.  Report increased symptoms to your care team and/or use the crisis resources listed below.    Crisis Resources:    Suicide Prevention Lifeline: 9-765-656-YTWR (0200)    Crisis Text Line Service (available 24 hours a day, 7 days a week): Text [place] to 757295    Call  **CRISIS (600913) from a cell phone to talk to a team of professionals who can help you.  Crisis Services By [facility]: Phone Number:   [place]     [phone]   [place]    [phone]   [place]    [phone]   [place]    [phone]   [place]    [phone]   [place] [phone]   [place]     [phone]       Call 911 or go to my nearest emergency department.     Managing Symptoms / Abstinence / Preventing Relapse:    Take all medicines as directed.  Carry a current list of medicines with you.    Use coping skills: breathing, mindfulness, grounding    Do not use illicit (street) drugs, controlled substances (narcotics) or alcohol.    Go to all appointments.    Report symptoms to your care team including: thoughts of suicide, loss of sleep, increased confusion, mood getting worse, feeling more aggressive.    Develop/Improve Independent Living/Socialization Skills: Continue your practice of skills, you have done so well with checking in with your body and figuring out what your needs are!  Continue with your sobriety journey,  you have learned and practiced so many skills to ensure you stay sober!    Community Resources/Supports and Discharge Planning:    Follow up with psychiatrist / main caregiver: tbd    Next visit: tbd    Follow up with your therapist: tbd   Next visit: tbd    Go to group therapy and / or support groups at: [facility] recovery.    See your medical doctor about:  Any other medical needs    Other:  na    Copy of summary sent to: [facility]      Client Signature:_________________________________   Date / Time:___________    Staff Signature:__________________________________   Date / Time:___________

## 2019-05-07 NOTE — PROGRESS NOTES
Acknowledgement of Current Treatment Plan       I have reviewed my treatment plan with my therapist / counselor on 5/8/19.   I agree with the plan as it is written in the electronic health record. (4B)    Name:      Signature:  Marissa Jasmine MD  Psychiatrist    Marissa Holland, NP    Angie Vela, [facility], BC-DMT  Psychotherapist    Cassia Vasquez, RN  Nurse Liaison    Cassia Vasquez, OTR/L  Occupational Therapist

## 2019-05-08 ENCOUNTER — HOSPITAL ENCOUNTER (OUTPATIENT)
Dept: BEHAVIORAL HEALTH | Facility: CLINIC | Age: 54
End: 2019-05-08
Attending: PSYCHIATRY & NEUROLOGY
Payer: MEDICARE

## 2019-05-08 PROCEDURE — H2012 BEHAV HLTH DAY TREAT, PER HR: HCPCS

## 2019-05-08 ASSESSMENT — ANXIETY QUESTIONNAIRES
IF YOU CHECKED OFF ANY PROBLEMS ON THIS QUESTIONNAIRE, HOW DIFFICULT HAVE THESE PROBLEMS MADE IT FOR YOU TO DO YOUR WORK, TAKE CARE OF THINGS AT HOME, OR GET ALONG WITH OTHER PEOPLE: SOMEWHAT DIFFICULT
2. NOT BEING ABLE TO STOP OR CONTROL WORRYING: SEVERAL DAYS
3. WORRYING TOO MUCH ABOUT DIFFERENT THINGS: SEVERAL DAYS
5. BEING SO RESTLESS THAT IT IS HARD TO SIT STILL: NEARLY EVERY DAY
1. FEELING NERVOUS, ANXIOUS, OR ON EDGE: SEVERAL DAYS
7. FEELING AFRAID AS IF SOMETHING AWFUL MIGHT HAPPEN: NOT AT ALL
6. BECOMING EASILY ANNOYED OR IRRITABLE: SEVERAL DAYS

## 2019-05-08 ASSESSMENT — PATIENT HEALTH QUESTIONNAIRE - PHQ9: SUM OF ALL RESPONSES TO PHQ QUESTIONS 1-9: 9

## 2019-05-08 NOTE — PROGRESS NOTES
"  Adult Mental Health Outpatient Group Therapy Progress Note     Marissa verbalizes the following treatment/discharge goals: \"I want to be better, I want to get out and have fun, I need to be around more people, have more friends, be social, live my life.\"        See Initial Treatment suggestions for the client during the time between Diagnostic Assessment and completion of the Master Individualized Treatment Plan.    Treatment Goals:     to be determined     Area of Treatment Focus:  Symptom Management, Personal Safety and Develop Socialization / Interpersonal Relationship Skills    Therapeutic Interventions/Treatment Strategies:  Support, Feedback, Safety Assessments, Clarification and Education    Response to Treatment Strategies:  Accepted Feedback, Gave Feedback, Listened, Focused on Goals, Attentive, Accepted Support and Alert    Name of Group:  Group Psychotherapy 1 10: AM, Group Psychotherapy 2 11:00-11:50    Group Participants: 7     Description and Outcome, Group Psychotherapy 1  Brittany was introduced to group members and oriented to group structure on her first day.  She shared with the group that she had transferred from [facility].  She also shared that she experiences much stress from son. She declined to take time today stating that she did not want to disclose too much on her first day.  Client denied any personal safety concerns and was supportive of the other group members.    Client demonstrated understanding of session content by being assertive with limits and actively listening..    Client would benefit from additional opportunities to practice and implement content from this session.    Description and Outcome, Group Psychotherapy 2  The group was provided with opportunity to view Sandra Rock \"Anatomy of Trust\", following by discussion.  Group members shared their struggles with trust and identified pieces of this video clip that could be applied. Clients demonstrated understanding of session by " sharing experiences with trust.  Brittany appeared uncomfortable during this last session.  She shared she experiences chronic pain.        Client would benefit from additional opportunities to practice and implement content from this session.    Is this a Weekly Review of the Progress on the Treatment Plan?  No

## 2019-05-15 ENCOUNTER — TELEPHONE (OUTPATIENT)
Dept: BEHAVIORAL HEALTH | Facility: CLINIC | Age: 54
End: 2019-05-15

## 2019-05-15 NOTE — TELEPHONE ENCOUNTER
Writer spoke with client as she did not come again today to day treatment. She stated she is too busy and overwhelmed with helping her 2 kids and that she is working and has been ill. She stated her mood is stable and is not having any suicidal thinking. We agreed that for now she would be discharged but when she is able to clear her schedule she could call and complete an update to return. She was pleased that she could return when she was able after an update.

## 2019-05-16 NOTE — PROGRESS NOTES
Adult Day Treatment  Discharge Summary/Instructions      Patient: Marissa Saenz    MRN: [id number]  : 1965        Age:  53 year old        Sex:  female     Admission Date: 2019  Discharge Date: 5/15/2019  Diagnosis: 296.40 Bipolar I Disorder Current or Most Recent Episode Hypomanic  300.22 (F40.00) Agoraphobia  303.90 (f10.20) Alcohol Use Disorder, Severe  Hx of ADHD     Focus of Treatment / Discharge Recommendations: Please continue to practice your coping skills to manage your symptoms of anxiety and activation.  Attend Day Treatment programming.     Personal Safety/ Management of Symptoms:     * Follow your safety plan.  Report increased symptoms to your care team and/or use the crisis resources listed below.     Crisis Resources:  ? Suicide Prevention Lifeline: 9-016-068-GYKR (1813)  ? Crisis Text Line Service (available 24 hours a day, 7 days a week): Text [place] to 740469  ? Call  **CRISIS (786078) from a cell phone to talk to a team of professionals who can help you.  Crisis Services By [facility]: Phone Number:   [place]     [phone]   [place]    [phone]   [place]    [phone]   [place]    [phone]   [place]    [phone]   [place] [phone]   [place]     [phone]      ? Call 076 or go to my nearest emergency department.         Managing Symptoms / Abstinence / Preventing Relapse:    Take all medicines as directed.  Carry a current list of medicines with you.    Use coping skills: breathing, mindfulness, grounding    Do not use illicit (street) drugs, controlled substances (narcotics) or alcohol.    Go to all appointments.    Report symptoms to your care team including: thoughts of suicide, loss of sleep, increased confusion, mood getting worse, feeling more aggressive.     Develop/Improve Independent Living/Socialization Skills: Continue your practice of skills, you have done so well with checking in with your body and figuring out what your needs are!  Continue with  Message  Message Free Text Note Form: Received page from [facility] 10/9/17 at 9:16 PM  Jaki Montes is complaining of painful swollen veins in his leg and foot  Advised ER to rule out DVT        Signatures   Electronically signed by : Lacie Santo, [facility]; Oct 10 2017 10:52AM EST                       (Author) your sobriety journey, you have learned and practiced so many skills to ensure you stay sober!     Community Resources/Supports and Discharge Planning:    Follow up with psychiatrist / main caregiver: tbd    Next visit: tbd     Follow up with your therapist: arvin   Next visit: tbd     Go to group therapy and / or support groups at: [facility].     See your medical doctor about:  Any other medical needs     Other: Client reached by phone due to absences. She reported she was unable to commit to day treatment at this time due to working and caring for her children. She would like to return when her schedule allows. She was provided intake and therapist phone number so she could call to complete an update when she was ready to start day treatment. She reported no suicidal thinking and mood as manageable although she does want to return for ongoing support when she is able. At this time she will be discharged.     Copy of summary sent to: [facility]        Client Signature:_________________________________   Date / Time:___________     Staff Signature:__________________________________   Date / Time:__5.16.19 @ 1205PM_________

## 2020-11-30 ENCOUNTER — RECORDS - HEALTHEAST (OUTPATIENT)
Dept: LAB | Facility: CLINIC | Age: 55
End: 2020-11-30

## 2020-11-30 LAB
ALBUMIN SERPL-MCNC: 3.7 G/DL (ref 3.5–5)
ALP SERPL-CCNC: 78 U/L (ref 45–120)
ALT SERPL W P-5'-P-CCNC: 94 U/L (ref 0–45)
ANION GAP SERPL CALCULATED.3IONS-SCNC: 11 MMOL/L (ref 5–18)
AST SERPL W P-5'-P-CCNC: 80 U/L (ref 0–40)
BILIRUB SERPL-MCNC: 0.4 MG/DL (ref 0–1)
BUN SERPL-MCNC: 21 MG/DL (ref 8–22)
CALCIUM SERPL-MCNC: 9 MG/DL (ref 8.5–10.5)
CHLORIDE BLD-SCNC: 108 MMOL/L (ref 98–107)
CO2 SERPL-SCNC: 21 MMOL/L (ref 22–31)
CREAT SERPL-MCNC: 1.05 MG/DL (ref 0.6–1.1)
GFR SERPL CREATININE-BSD FRML MDRD: 55 ML/MIN/1.73M2
GLUCOSE BLD-MCNC: 96 MG/DL (ref 70–125)
POTASSIUM BLD-SCNC: 4 MMOL/L (ref 3.5–5)
PROT SERPL-MCNC: 6.8 G/DL (ref 6–8)
SODIUM SERPL-SCNC: 140 MMOL/L (ref 136–145)
TSH SERPL DL<=0.005 MIU/L-ACNC: 1.51 UIU/ML (ref 0.3–5)

## 2020-12-01 LAB — 25(OH)D3 SERPL-MCNC: 22.7 NG/ML (ref 30–80)

## 2021-05-26 ENCOUNTER — RECORDS - HEALTHEAST (OUTPATIENT)
Dept: ADMINISTRATIVE | Facility: CLINIC | Age: 56
End: 2021-05-26

## 2021-05-30 ENCOUNTER — RECORDS - HEALTHEAST (OUTPATIENT)
Dept: ADMINISTRATIVE | Facility: CLINIC | Age: 56
End: 2021-05-30

## 2021-06-03 ENCOUNTER — RECORDS - HEALTHEAST (OUTPATIENT)
Dept: ADMINISTRATIVE | Facility: CLINIC | Age: 56
End: 2021-06-03

## 2022-06-24 ENCOUNTER — APPOINTMENT (OUTPATIENT)
Dept: RADIOLOGY | Facility: CLINIC | Age: 57
End: 2022-06-24
Payer: MEDICARE

## 2022-06-24 ENCOUNTER — HOSPITAL ENCOUNTER (EMERGENCY)
Facility: CLINIC | Age: 57
Discharge: HOME | End: 2022-06-24
Admitting: PHYSICIAN ASSISTANT
Payer: MEDICARE

## 2022-06-24 ENCOUNTER — APPOINTMENT (OUTPATIENT)
Dept: RADIOLOGY | Facility: CLINIC | Age: 57
End: 2022-06-24
Attending: EMERGENCY MEDICINE
Payer: MEDICARE

## 2022-06-24 VITALS
WEIGHT: 197 LBS | SYSTOLIC BLOOD PRESSURE: 172 MMHG | BODY MASS INDEX: 31.66 KG/M2 | OXYGEN SATURATION: 95 % | DIASTOLIC BLOOD PRESSURE: 119 MMHG | HEART RATE: 87 BPM | TEMPERATURE: 97.1 F | RESPIRATION RATE: 20 BRPM | HEIGHT: 66 IN

## 2022-06-24 DIAGNOSIS — M25.562 ACUTE PAIN OF LEFT KNEE: ICD-10-CM

## 2022-06-24 PROCEDURE — 73630 X-RAY EXAM OF FOOT: CPT | Mod: LT

## 2022-06-24 PROCEDURE — 99284 EMERGENCY DEPT VISIT MOD MDM: CPT | Mod: 25

## 2022-06-24 PROCEDURE — 29505 APPLICATION LONG LEG SPLINT: CPT

## 2022-06-24 PROCEDURE — 73562 X-RAY EXAM OF KNEE 3: CPT | Mod: LT

## 2022-06-24 ASSESSMENT — ENCOUNTER SYMPTOMS
NUMBNESS: 0
WEAKNESS: 0

## 2022-06-24 NOTE — ED PROVIDER NOTES
Emergency Department Encounter   NAME: Marissa Saenz ; AGE: 56 year old female ; YOB: 1965 ; MRN: [id number] ; PCP: Martinez Arguelles   ED PROVIDER: Marybeth Love PA-C    Evaluation Date & Time:   6/24/2022 11:14 AM    CHIEF COMPLAINT:  Knee Pain      Impression and Plan   MDM: Marissa Saenz is a 56 year old female with a pertinent history of allergies, arthritis, ectopic pregnancy, who presents to the ED by private vehicle for evaluation of left knee and foot pain.  The patient presents to the emergency department after she dropped a heavy can on her left foot a week and a half ago and then twisted her left knee yesterday morning and is now having pain to both of those areas as well as her left knee is intermittently giving out.  Here in the ED, she is vitally stable, generally well-appearing.  No acute distress.  Emiliano has been working well for her pain.  She has mild swelling to the dorsum and medial side of her left foot -x-ray ordered.  No involvement of the ankle joint.  She also has medial left knee joint line tenderness as well as a positive Belen's.  X-ray ordered.  Distal CMS is intact.  Range of motion at knee joint and hip joint intact -no concern for suprapatellar or quadriceps tendon injuries.  No popliteal mass or tenderness concerning for arterial injury.  Foot x-ray is negative.  Knee x-ray shows no acute fracture, subluxation, dislocation or joint effusion.  She does have some moderate degenerative changes in the knee but nothing that appears acute.  Her left foot pain is likely due to soft tissue swelling from the injury.  I have a high suspicion for a left-sided meniscal injury given exam.  Due to this, provided Ace wrap for the left foot as well as knee immobilizer for left knee.  She has been ambulating on the knee successfully -no indication for crutches.  Plan at this time is to follow-up with [facility] that she can undergo MRI of the knee.  We discussed  "supportive measures at home including icing, elevating, and Aleve for pain as well as concerning signs and symptoms to return to the ED.  Patient verbalized understanding is comfortable the plan.  Discharged home in good condition.    ED COURSE:  11:04 AM I met and introduced myself to the patient. I gathered initial history and performed my physical exam. We discussed plan for initial workup.   11:55 AM reviewed x-ray imaging.  Discussed with patient as well as discharge, follow-up, and reasons to return to the emergency department.  Advise recheck of blood pressure.    At the conclusion of the encounter I discussed the results of all the tests and the disposition. The questions were answered. The patient or family acknowledged understanding and was agreeable with the care plan.    FINAL IMPRESSION:    ICD-10-CM    1. Acute pain of left knee  M25.562 KNEE IMMOBILIZER SPLINT         MEDICATIONS GIVEN IN THE EMERGENCY DEPARTMENT:  Medications - No data to display      NEW PRESCRIPTIONS STARTED AT TODAY'S ED VISIT:  Discharge Medication List as of 6/24/2022 12:13 PM            HPI   Patient information was obtained from: Patient    Use of Intrepreter: N/A    Marissa GONZALEZ Dany is a 56 year old female with a pertinent history of allergies, arthritis, ectopic pregnancy, who presents to the ED by private vehicle for evaluation of left knee and foot pain.     The patient reports that about a week and a half ago, she dropped a heavy candle onto her left foot.  She has had pain and swelling to the dorsum and medial side of the foot though this has gradually been improving.  Yesterday morning, she went to get out of bed, when she stepped and put weight on her left knee weird and felt a \"pop\".  Since that time, she has had pain along the medial knee as well as some intermittent feelings of the knee giving out.  She is still able to walk and denies any weakness or numbness in the leg.  No overt trauma.  She is anticoagulated. " " No previous surgeries to that leg.    REVIEW OF SYSTEMS:  Review of Systems   Skin:        Positive for left foot and left knee pain.   Neurological: Negative for weakness and numbness.   All other systems reviewed and are negative.        Medical History     Past Medical History:   Diagnosis Date     Allergy      Arthritis        Past Surgical History:   Procedure Laterality Date     ECTOPIC PREGNANCY SURGERY         Family History   Problem Relation Age of Onset     Kidney Disease Mother      Depression Mother      Substance Abuse Mother      Cerebrovascular Disease Father      Substance Abuse Father      Chronic Obstructive Pulmonary Disease Father      Substance Abuse Brother      Substance Abuse Sister      Mental Illness Son      Substance Abuse Sister      Substance Abuse Brother        Social History     Tobacco Use     Smoking status: Current Every Day Smoker     Packs/day: 1.00   Substance Use Topics     Alcohol use: No     Drug use: No       busPIRone (BUSPAR) 10 MG tablet  Cetirizine HCl (ZYRTEC PO)  citalopram (CELEXA) 20 MG tablet          Physical Exam     First Vitals:  Patient Vitals for the past 24 hrs:   BP Temp Temp src Pulse Resp SpO2 Height Weight   06/24/22 1049 (!) 172/119 97.1  F (36.2  C) Temporal 87 20 95 % 1.664 m (5' 5.5\") 89.4 kg (197 lb)         PHYSICAL EXAM:   Physical Exam  Vitals and nursing note reviewed.   Constitutional:       General: She is not in acute distress.     Appearance: Normal appearance. She is not toxic-appearing.   HENT:      Head: Normocephalic and atraumatic.   Eyes:      Conjunctiva/sclera: Conjunctivae normal.   Pulmonary:      Effort: Pulmonary effort is normal.   Musculoskeletal:      Comments: Mild area of swelling to the dorsal superior and medial left foot with overlying tenderness.  No palpable bony step-offs.  No involvement of the ankle.  Achilles tendon is intact.  5 out of 5 strength with dorsiflexion and plantarflexion.  2+ DP and PT pulses and " extremities are warm and well-perfused with distal cap refill less than 2 seconds.  She does have tenderness along her medial joint line as well as a positive Belen's.  No laxity and negative Lachman's.  She is ambulating on the knee without difficulties and walks to her chair.  5 out of 5 strength with knee flexion and extension as well as hip flexion.   Neurological:      Mental Status: She is alert.       Results     LAB:  All pertinent labs reviewed and interpreted  Labs Ordered and Resulted from Time of ED Arrival to Time of ED Departure - No data to display    RADIOLOGY:  Foot XR, G/E 3 views, left   Final Result   IMPRESSION: Normal joint spaces and alignment. No fracture.      XR Knee Left 3 Views   Final Result   IMPRESSION: No acute fracture or significant joint effusion. Moderate patellofemoral compartment degenerative change with a well-corticated ossific fragment adjacent to lateral patella facet, chronic.            Marybeth Love PA-C   Emergency Medicine   [facility] EMERGENCY ROOM       Marybeth Love PA-C  06/24/22 1400

## 2022-06-24 NOTE — ED NOTES
Did not retake vitals since was in the hallway.    Patient has a cuff at home and will retake and follow up with her provider as needed.  Thelma Ellison RN 6/24/2022 1:04 PM

## 2022-06-24 NOTE — DISCHARGE INSTRUCTIONS
As we discussed, your presentation today is concerning for a possible tear to your left knee meniscus.  I would like to refer you to [facility] so that they are able to obtain an outpatient MRI to assess this further.  If you feel that your knee is giving out or popping, please wear the knee immobilizer.  If this improves and you just have pain, you may use the Ace wrap.  Elevate the leg above heart level when able.  Ice the knee and the foot for 20 minutes every 2-3 hours, and continue to use your home Aleve for pain relief.  If at anytime you have uncontrolled pain, skin changes, weakness or numbness of the leg, please return to the ER for further evaluation.    Your blood pressure was elevated in the emergencydepartment today and requires recheck and close follow-up in your primary care clinic. Untreated blood pressure can cause serious complications including, but not limited to stroke, heart attack/failure, and kidney disease.  Please make a close follow-up appointment to have this recheck performed. Please return to the emergency department immediately if you develop a severe headache, vision changes, chest pain, shortness of breath, orabdominal pain.

## 2022-06-24 NOTE — ED TRIAGE NOTES
"Arrives to ED with c/o L knee pain x2 days. Reports heard \"pop\" when pt was getting out of bed. Has been taking aleve with mild relief. 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 Also reports dropping trash on L foot x2 weeks ago.      Triage Assessment     Row Name 06/24/22 1051       Triage Assessment (Adult)    Airway WDL WDL       Respiratory WDL    Respiratory WDL WDL       Skin Circulation/Temperature WDL    Skin Circulation/Temperature WDL WDL       Cardiac WDL    Cardiac WDL X  HTN       Peripheral/Neurovascular WDL    Peripheral Neurovascular WDL WDL       Cognitive/Neuro/Behavioral WDL    Cognitive/Neuro/Behavioral WDL WDL              " Pt would like to stop the depo and is requesting Kilo Barboza. She has taken it in the past and had no problems.   [facility] [address].

## 2022-12-12 ENCOUNTER — LAB REQUISITION (OUTPATIENT)
Dept: LAB | Facility: CLINIC | Age: 57
End: 2022-12-12
Payer: MEDICARE

## 2022-12-12 DIAGNOSIS — N95.1 MENOPAUSAL AND FEMALE CLIMACTERIC STATES: ICD-10-CM

## 2022-12-12 DIAGNOSIS — I10 ESSENTIAL (PRIMARY) HYPERTENSION: ICD-10-CM

## 2022-12-12 DIAGNOSIS — R51.9 HEADACHE, UNSPECIFIED: ICD-10-CM

## 2022-12-12 LAB
ALBUMIN SERPL BCG-MCNC: 4.2 G/DL (ref 3.5–5.2)
ALP SERPL-CCNC: 87 U/L (ref 35–104)
ALT SERPL W P-5'-P-CCNC: 90 U/L (ref 10–35)
ANION GAP SERPL CALCULATED.3IONS-SCNC: 16 MMOL/L (ref 7–15)
AST SERPL W P-5'-P-CCNC: 94 U/L (ref 10–35)
BILIRUB SERPL-MCNC: 0.4 MG/DL
BUN SERPL-MCNC: 20.4 MG/DL (ref 6–20)
CALCIUM SERPL-MCNC: 9.7 MG/DL (ref 8.6–10)
CHLORIDE SERPL-SCNC: 104 MMOL/L (ref 98–107)
CREAT SERPL-MCNC: 1.45 MG/DL (ref 0.51–0.95)
DEPRECATED HCO3 PLAS-SCNC: 22 MMOL/L (ref 22–29)
ERYTHROCYTE [SEDIMENTATION RATE] IN BLOOD BY WESTERGREN METHOD: 9 MM/HR (ref 0–30)
FSH SERPL IRP2-ACNC: 123 MIU/ML
GFR SERPL CREATININE-BSD FRML MDRD: 42 ML/MIN/1.73M2
GLUCOSE SERPL-MCNC: 96 MG/DL (ref 70–99)
POTASSIUM SERPL-SCNC: 3.8 MMOL/L (ref 3.4–5.3)
PROT SERPL-MCNC: 6.8 G/DL (ref 6.4–8.3)
SODIUM SERPL-SCNC: 142 MMOL/L (ref 136–145)
TSH SERPL DL<=0.005 MIU/L-ACNC: 2.12 UIU/ML (ref 0.3–4.2)

## 2022-12-12 PROCEDURE — 84443 ASSAY THYROID STIM HORMONE: CPT | Mod: ORL | Performed by: FAMILY MEDICINE

## 2022-12-12 PROCEDURE — 80053 COMPREHEN METABOLIC PANEL: CPT | Mod: ORL | Performed by: FAMILY MEDICINE

## 2022-12-12 PROCEDURE — 83001 ASSAY OF GONADOTROPIN (FSH): CPT | Mod: ORL | Performed by: FAMILY MEDICINE

## 2022-12-12 PROCEDURE — 85652 RBC SED RATE AUTOMATED: CPT | Mod: ORL | Performed by: FAMILY MEDICINE

## 2024-05-28 ENCOUNTER — HOSPITAL ENCOUNTER (EMERGENCY)
Facility: HOSPITAL | Age: 59
Discharge: HOME | End: 2024-05-28
Attending: EMERGENCY MEDICINE | Admitting: EMERGENCY MEDICINE
Payer: MEDICARE

## 2024-05-28 ENCOUNTER — APPOINTMENT (OUTPATIENT)
Dept: RADIOLOGY | Facility: HOSPITAL | Age: 59
End: 2024-05-28
Attending: EMERGENCY MEDICINE
Payer: MEDICARE

## 2024-05-28 VITALS
DIASTOLIC BLOOD PRESSURE: 78 MMHG | BODY MASS INDEX: 29.99 KG/M2 | WEIGHT: 180 LBS | RESPIRATION RATE: 22 BRPM | TEMPERATURE: 97.5 F | OXYGEN SATURATION: 99 % | HEART RATE: 73 BPM | SYSTOLIC BLOOD PRESSURE: 171 MMHG | HEIGHT: 65 IN

## 2024-05-28 DIAGNOSIS — R06.2 WHEEZING: ICD-10-CM

## 2024-05-28 DIAGNOSIS — E87.6 HYPOKALEMIA: ICD-10-CM

## 2024-05-28 DIAGNOSIS — R03.0 ELEVATED BLOOD PRESSURE READING: ICD-10-CM

## 2024-05-28 DIAGNOSIS — K62.5 BRIGHT RED BLOOD PER RECTUM: ICD-10-CM

## 2024-05-28 LAB
ABO/RH(D): NORMAL
ALBUMIN SERPL BCG-MCNC: 3.8 G/DL (ref 3.5–5.2)
ALP SERPL-CCNC: 72 U/L (ref 40–150)
ALT SERPL W P-5'-P-CCNC: 36 U/L (ref 0–50)
ANION GAP SERPL CALCULATED.3IONS-SCNC: 12 MMOL/L (ref 7–15)
ANTIBODY SCREEN: NEGATIVE
AST SERPL W P-5'-P-CCNC: 42 U/L (ref 0–45)
BASOPHILS # BLD AUTO: 0.1 10E3/UL (ref 0–0.2)
BASOPHILS NFR BLD AUTO: 1 %
BILIRUB SERPL-MCNC: 0.4 MG/DL
BUN SERPL-MCNC: 28.6 MG/DL (ref 6–20)
CALCIUM SERPL-MCNC: 8.6 MG/DL (ref 8.6–10)
CHLORIDE SERPL-SCNC: 106 MMOL/L (ref 98–107)
CREAT SERPL-MCNC: 1.95 MG/DL (ref 0.51–0.95)
DEPRECATED HCO3 PLAS-SCNC: 23 MMOL/L (ref 22–29)
EGFRCR SERPLBLD CKD-EPI 2021: 29 ML/MIN/1.73M2
EOSINOPHIL # BLD AUTO: 0.6 10E3/UL (ref 0–0.7)
EOSINOPHIL NFR BLD AUTO: 5 %
ERYTHROCYTE [DISTWIDTH] IN BLOOD BY AUTOMATED COUNT: 13.1 % (ref 10–15)
GLUCOSE SERPL-MCNC: 110 MG/DL (ref 70–99)
HCT VFR BLD AUTO: 23.9 % (ref 35–47)
HGB BLD-MCNC: 8.7 G/DL (ref 11.7–15.7)
HGB BLD-MCNC: 8.7 G/DL (ref 11.7–15.7)
HOLD SPECIMEN: NORMAL
IMM GRANULOCYTES # BLD: 0.1 10E3/UL
IMM GRANULOCYTES NFR BLD: 1 %
INR PPP: 1.11 (ref 0.85–1.15)
LYMPHOCYTES # BLD AUTO: 2.4 10E3/UL (ref 0.8–5.3)
LYMPHOCYTES NFR BLD AUTO: 21 %
MAGNESIUM SERPL-MCNC: 1.7 MG/DL (ref 1.7–2.3)
MCH RBC QN AUTO: 37.3 PG (ref 26.5–33)
MCHC RBC AUTO-ENTMCNC: 36.4 G/DL (ref 31.5–36.5)
MCV RBC AUTO: 103 FL (ref 78–100)
MONOCYTES # BLD AUTO: 0.6 10E3/UL (ref 0–1.3)
MONOCYTES NFR BLD AUTO: 5 %
NEUTROPHILS # BLD AUTO: 8 10E3/UL (ref 1.6–8.3)
NEUTROPHILS NFR BLD AUTO: 67 %
NRBC # BLD AUTO: 0 10E3/UL
NRBC BLD AUTO-RTO: 0 /100
PLATELET # BLD AUTO: 295 10E3/UL (ref 150–450)
POTASSIUM SERPL-SCNC: 3.1 MMOL/L (ref 3.4–5.3)
PROT SERPL-MCNC: 5.9 G/DL (ref 6.4–8.3)
RBC # BLD AUTO: 2.33 10E6/UL (ref 3.8–5.2)
SODIUM SERPL-SCNC: 141 MMOL/L (ref 135–145)
SPECIMEN EXPIRATION DATE: NORMAL
TROPONIN T SERPL HS-MCNC: 37 NG/L
TROPONIN T SERPL HS-MCNC: 42 NG/L
WBC # BLD AUTO: 11.7 10E3/UL (ref 4–11)

## 2024-05-28 PROCEDURE — 86900 BLOOD TYPING SEROLOGIC ABO: CPT | Performed by: EMERGENCY MEDICINE

## 2024-05-28 PROCEDURE — 93005 ELECTROCARDIOGRAM TRACING: CPT | Performed by: EMERGENCY MEDICINE

## 2024-05-28 PROCEDURE — 85025 COMPLETE CBC W/AUTO DIFF WBC: CPT | Performed by: EMERGENCY MEDICINE

## 2024-05-28 PROCEDURE — 36415 COLL VENOUS BLD VENIPUNCTURE: CPT | Performed by: EMERGENCY MEDICINE

## 2024-05-28 PROCEDURE — 84484 ASSAY OF TROPONIN QUANT: CPT | Performed by: EMERGENCY MEDICINE

## 2024-05-28 PROCEDURE — 85610 PROTHROMBIN TIME: CPT | Performed by: EMERGENCY MEDICINE

## 2024-05-28 PROCEDURE — 99285 EMERGENCY DEPT VISIT HI MDM: CPT | Mod: 25

## 2024-05-28 PROCEDURE — 85018 HEMOGLOBIN: CPT | Mod: 91 | Performed by: EMERGENCY MEDICINE

## 2024-05-28 PROCEDURE — 82040 ASSAY OF SERUM ALBUMIN: CPT | Performed by: EMERGENCY MEDICINE

## 2024-05-28 PROCEDURE — 71046 X-RAY EXAM CHEST 2 VIEWS: CPT

## 2024-05-28 PROCEDURE — 250N000013 HC RX MED GY IP 250 OP 250 PS 637: Performed by: EMERGENCY MEDICINE

## 2024-05-28 PROCEDURE — 36415 COLL VENOUS BLD VENIPUNCTURE: CPT | Performed by: STUDENT IN AN ORGANIZED HEALTH CARE EDUCATION/TRAINING PROGRAM

## 2024-05-28 PROCEDURE — 250N000011 HC RX IP 250 OP 636: Performed by: EMERGENCY MEDICINE

## 2024-05-28 PROCEDURE — 250N000009 HC RX 250: Performed by: EMERGENCY MEDICINE

## 2024-05-28 PROCEDURE — 96374 THER/PROPH/DIAG INJ IV PUSH: CPT

## 2024-05-28 PROCEDURE — 83735 ASSAY OF MAGNESIUM: CPT | Performed by: EMERGENCY MEDICINE

## 2024-05-28 PROCEDURE — 94640 AIRWAY INHALATION TREATMENT: CPT

## 2024-05-28 RX ORDER — IPRATROPIUM BROMIDE AND ALBUTEROL SULFATE 2.5; .5 MG/3ML; MG/3ML
3 SOLUTION RESPIRATORY (INHALATION) ONCE
Status: COMPLETED | OUTPATIENT
Start: 2024-05-28 | End: 2024-05-28

## 2024-05-28 RX ORDER — AMLODIPINE BESYLATE 5 MG/1
5 TABLET ORAL DAILY
Qty: 30 TABLET | Refills: 0 | Status: SHIPPED | OUTPATIENT
Start: 2024-05-28

## 2024-05-28 RX ORDER — POTASSIUM CHLORIDE 1500 MG/1
40 TABLET, EXTENDED RELEASE ORAL ONCE
Status: COMPLETED | OUTPATIENT
Start: 2024-05-28 | End: 2024-05-28

## 2024-05-28 RX ORDER — LABETALOL HYDROCHLORIDE 5 MG/ML
10 INJECTION, SOLUTION INTRAVENOUS ONCE
Status: COMPLETED | OUTPATIENT
Start: 2024-05-28 | End: 2024-05-28

## 2024-05-28 RX ORDER — ALBUTEROL SULFATE 90 UG/1
2 AEROSOL, METERED RESPIRATORY (INHALATION) EVERY 6 HOURS PRN
Qty: 18 G | Refills: 0 | Status: SHIPPED | OUTPATIENT
Start: 2024-05-28

## 2024-05-28 RX ADMIN — IPRATROPIUM BROMIDE AND ALBUTEROL SULFATE 3 ML: .5; 3 SOLUTION RESPIRATORY (INHALATION) at 11:37

## 2024-05-28 RX ADMIN — POTASSIUM CHLORIDE 40 MEQ: 1500 TABLET, EXTENDED RELEASE ORAL at 12:20

## 2024-05-28 RX ADMIN — LABETALOL HYDROCHLORIDE 10 MG: 5 INJECTION, SOLUTION INTRAVENOUS at 13:09

## 2024-05-28 ASSESSMENT — ENCOUNTER SYMPTOMS
LIGHT-HEADEDNESS: 1
ROS GI COMMENTS: NEGATIVE FOR HEMATEMESIS.
BLOOD IN STOOL: 1
COUGH: 1
ABDOMINAL PAIN: 1
SHORTNESS OF BREATH: 1

## 2024-05-28 ASSESSMENT — ACTIVITIES OF DAILY LIVING (ADL)
ADLS_ACUITY_SCORE: 35

## 2024-05-28 ASSESSMENT — COLUMBIA-SUICIDE SEVERITY RATING SCALE - C-SSRS
2. HAVE YOU ACTUALLY HAD ANY THOUGHTS OF KILLING YOURSELF IN THE PAST MONTH?: NO
1. IN THE PAST MONTH, HAVE YOU WISHED YOU WERE DEAD OR WISHED YOU COULD GO TO SLEEP AND NOT WAKE UP?: NO
6. HAVE YOU EVER DONE ANYTHING, STARTED TO DO ANYTHING, OR PREPARED TO DO ANYTHING TO END YOUR LIFE?: NO

## 2024-05-28 NOTE — DISCHARGE INSTRUCTIONS
Return immediately to the ER to develop any further rectal bleeding.  If you start feeling lightheaded again return to the ER.  Recommend amlodipine daily for elevated blood pressure.  Follow-up primary care doctor and GI for endoscopy and colonoscopy.  Recommend ProAir 2 puffs every 6 hours as needed for wheeze.  Recommend omeprazole daily

## 2024-05-28 NOTE — ED TRIAGE NOTES
Patient arrives by private car with her  for evaluation of rectal bleeding.  Patient reports blood in stool since Sunday.  Feels light headed and short of breath with any exertion.

## 2024-05-28 NOTE — ED PROVIDER NOTES
EMERGENCY DEPARTMENT ENCOUNTER      NAME: Marissa Saenz  AGE: 58 year old female  YOB: 1965  MRN: [id number]  EVALUATION DATE & TIME: No admission date for patient encounter.    PCP: Martinez Arguelles    ED PROVIDER: Viet English MD      Chief Complaint   Patient presents with    Rectal Bleeding         FINAL IMPRESSION:  1. Hypokalemia    2. Bright red blood per rectum    3. Wheezing    4. Elevated blood pressure reading          ED COURSE & MEDICAL DECISION MAKING:    Pertinent Labs & Imaging studies reviewed. (See chart for details)  58 year old female presents to the Emergency Department for evaluation of shortness of breath and lightheadedness    On exam hoarse voice chronic per patient, faint wheezing with stethoscope    Reports spontaneous rectal bleeding last few days but has not had any today    ED Course as of 05/28/24 1409   Tue May 28, 2024   1145 Patient presents with bright red blood per rectum that was occurring spontaneously.  Has had some intermittent upper abdominal pain.  Has not seen a doctor in quite some time.  Reports every day alcohol use   1145 No known history of ulcers or varices   1145 Differential includes hemorrhoidal bleeding, diverticular bleed, colitis, bleeding ulcer, variceal bleed   1146 Metabolic panel, CBC, INR, type and screen, troponin ordered   1146 On exam therefore DuoNeb ordered will obtain chest x-ray to evaluate for pneumonia or pulmonary edema   1146 Hemoglobin for comparison.  Will repeat hemoglobin in 3 hours   1146 With GFR 29 and hesitant about doing a CTA angio when patient is stable   1147 Potassium(!): 3.1  Patient given for potassium for her potassium of 3.1, likely low secondary to daily alcohol use   1147 INR: 1.11   1147 Platelet Count: 295   1148 Hemoglobin(!): 8.7   1148 WBC(!): 11.7   1148 Urea Nitrogen(!): 28.6   1148 Creatinine(!): 1.95   1149 Patient was seen an [facility] on May 5, 2024 per external chart review for cough.    1305 BP(!): 211/86  Labetalol ordered   1306 Troponin T, High Sensitivity(!): 42  Delta troponin ordered   1357 Troponin T, High Sensitivity(!): 37  Troponin downtrending   1357 Hemoglobin(!): 8.7  Stable, no rectal bleeding today   1404 Pressure elevated in urgent care on May 5 as well.  Discussed starting blood pressure medicine.  Patient is interested in starting blood pressure medicine.  Will start amlodipine   1404 Discussed admission for observation for GI to see to expedite endoscopy and colonoscopy for bright red blood per rectum.  Patient is declining this and will follow-up with her primary care doctor.  Patient is feeling better after the DuoNeb   1404 Prescribed amlodipine.  Will prescribe ProAir.  GI referral created   1405 Has had no rectal bleeding today.  She will return to the ER for any return of rectal bleed   1408 Consider CTA imaging however with stable hemoglobins and GFR less than 30 I feel active arterial bleed would be unlikely seen on CT imaging and contrast would potentially be harmful for patient     1:58 PM I rechecked and updated the patient. She is feeling better. Discussed staying for observation and meeting with GI, but she would like to do it outpatient. I think this resonable as she has not had any bleeding today. Giulianon wants to start blood pressure medication.       Medical Decision Making  Obtained supplemental history:Supplemental history obtained?: No  Reviewed external records: External records reviewed?: Documented in chart  Care impacted by chronic illness:Mental Health  Care significantly affected by social determinants of health:N/A  Did you consider but not order tests?: Work up considered but not performed and documented in chart, if applicable  Did you interpret images independently?: Independent interpretation of ECG and images noted in documentation, when applicable.  Consultation discussion with other provider:Did you involve another provider (consultant, ,  "pharmacy, etc.)?: No  Discharge. I prescribed additional prescription strength medication(s) as charted. I considered admission, but discharged the patient after share decision making conversation.    At the conclusion of the encounter I discussed the results of all of the tests and the disposition. The questions were answered. The patient or family acknowledged understanding and was agreeable with the care plan.         MEDICATIONS GIVEN IN THE EMERGENCY:  Medications   ipratropium - albuterol 0.5 mg/2.5 mg/3 mL (DUONEB) neb solution 3 mL (3 mLs Nebulization $Given 5/28/24 7287)   potassium chloride paula ER (KLOR-CON M20) CR tablet 40 mEq (40 mEq Oral $Given 5/28/24 1220)   labetalol (NORMODYNE/TRANDATE) injection 10 mg (10 mg Intravenous $Given 5/28/24 1309)       NEW PRESCRIPTIONS STARTED AT TODAY'S ER VISIT  New Prescriptions    ALBUTEROL (PROAIR HFA/PROVENTIL HFA/VENTOLIN HFA) 108 (90 BASE) MCG/ACT INHALER    Inhale 2 puffs into the lungs every 6 hours as needed for shortness of breath, wheezing or cough    AMLODIPINE (NORVASC) 5 MG TABLET    Take 1 tablet (5 mg) by mouth daily    OMEPRAZOLE (PRILOSEC) 20 MG DR CAPSULE    Take 2 capsules (40 mg) by mouth daily for 30 days          =================================================================    HPI    Patient information was obtained from: patient     Use of : N/A         Marissa GONZALEZ Dany is a 58 year old female with a pertinent history of kidney disease and alcohol use who presents to this ED via walk-in for evaluation of rectal bleeding.    Patient reports rectal bleeding and shortness of breath with exertion starting 3 days ago. She endorses melena and bright red blood that \"gushes\" with BMs and spontaneously when coughing. Patient says she started to feel better yesterday but had another episode of shortness of breath with lightheadedness and weakness this morning, though she denies any rectal bleeding today. She notes some abdominal pain " "with eating and some allergy issues she has been taking mucinex for for the past couple weeks. Patient smokes and drinks alcohol daily. Otherwise, patient denies any family or personal history of colon cancer, history of stomach cancer, taking any blood thinners, any history of heart issues, asthma, or hematemesis. No other complaints at this time.     REVIEW OF SYSTEMS   Review of Systems   Respiratory:  Positive for cough and shortness of breath.    Gastrointestinal:  Positive for abdominal pain and blood in stool.        Negative for hematemesis.    Neurological:  Positive for light-headedness.        PAST MEDICAL HISTORY:  Past Medical History:   Diagnosis Date    Allergy     Arthritis        PAST SURGICAL HISTORY:  Past Surgical History:   Procedure Laterality Date    ECTOPIC PREGNANCY SURGERY             CURRENT MEDICATIONS:    albuterol (PROAIR HFA/PROVENTIL HFA/VENTOLIN HFA) 108 (90 Base) MCG/ACT inhaler  amLODIPine (NORVASC) 5 MG tablet  omeprazole (PRILOSEC) 20 MG DR capsule  busPIRone (BUSPAR) 10 MG tablet  Cetirizine HCl (ZYRTEC PO)  citalopram (CELEXA) 20 MG tablet        ALLERGIES:  No Known Allergies    FAMILY HISTORY:  Family History   Problem Relation Age of Onset    Kidney Disease Mother     Depression Mother     Substance Abuse Mother     Cerebrovascular Disease Father     Substance Abuse Father     Chronic Obstructive Pulmonary Disease Father     Substance Abuse Brother     Substance Abuse Sister     Mental Illness Son     Substance Abuse Sister     Substance Abuse Brother        SOCIAL HISTORY:   Social History     Socioeconomic History    Marital status: Single   Tobacco Use    Smoking status: Every Day     Current packs/day: 1.00     Types: Cigarettes   Substance and Sexual Activity    Alcohol use: No    Drug use: No    Sexual activity: Never       VITALS:  BP (!) 171/78   Pulse 78   Temp 97.5  F (36.4  C)   Resp 22   Ht 1.651 m (5' 5\")   Wt 81.6 kg (180 lb)   SpO2 100%   BMI 29.95 " "kg/m      PHYSICAL EXAM      Vitals: BP (!) 171/78   Pulse 78   Temp 97.5  F (36.4  C)   Resp 22   Ht 1.651 m (5' 5\")   Wt 81.6 kg (180 lb)   SpO2 100%   BMI 29.95 kg/m    General: Appears in no acute distress, awake, alert, interactive, hoarse voice.  Eyes: Conjunctivae non-injected. Sclera anicteric.  HENT: Atraumatic.  Neck: Supple.  Respiratory/Chest: wheezing with stethoscope.  No stridor.  No drooling.  After DuoNeb wheezing is resolved  Abdomen: non distended, nontender  Musculoskeletal: Normal extremities. No edema or erythema.  Skin: Normal color. No rash or diaphoresis.  Neurologic: Face symmetric, moves all extremities spontaneously. Speech clear.  Psychiatric: Oriented to person, place, and time. Affect appropriate.    LAB:  All pertinent labs reviewed and interpreted.  Results for orders placed or performed during the hospital encounter of 05/28/24   Chest XR,  PA & LAT    Impression    IMPRESSION: Minimal scarring or linear atelectasis in the right middle lobe and lingula, unchanged. Lungs are otherwise clear. No pleural effusions. Heart size and pulmonary vascularity are within normal limits.   Result Value Ref Range    INR 1.11 0.85 - 1.15   Comprehensive metabolic panel   Result Value Ref Range    Sodium 141 135 - 145 mmol/L    Potassium 3.1 (L) 3.4 - 5.3 mmol/L    Carbon Dioxide (CO2) 23 22 - 29 mmol/L    Anion Gap 12 7 - 15 mmol/L    Urea Nitrogen 28.6 (H) 6.0 - 20.0 mg/dL    Creatinine 1.95 (H) 0.51 - 0.95 mg/dL    GFR Estimate 29 (L) >60 mL/min/1.73m2    Calcium 8.6 8.6 - 10.0 mg/dL    Chloride 106 98 - 107 mmol/L    Glucose 110 (H) 70 - 99 mg/dL    Alkaline Phosphatase 72 40 - 150 U/L    AST 42 0 - 45 U/L    ALT 36 0 - 50 U/L    Protein Total 5.9 (L) 6.4 - 8.3 g/dL    Albumin 3.8 3.5 - 5.2 g/dL    Bilirubin Total 0.4 <=1.2 mg/dL   CBC with platelets and differential   Result Value Ref Range    WBC Count 11.7 (H) 4.0 - 11.0 10e3/uL    RBC Count 2.33 (L) 3.80 - 5.20 10e6/uL    Hemoglobin " 8.7 (L) 11.7 - 15.7 g/dL    Hematocrit 23.9 (L) 35.0 - 47.0 %     (H) 78 - 100 fL    MCH 37.3 (H) 26.5 - 33.0 pg    MCHC 36.4 31.5 - 36.5 g/dL    RDW 13.1 10.0 - 15.0 %    Platelet Count 295 150 - 450 10e3/uL    % Neutrophils 67 %    % Lymphocytes 21 %    % Monocytes 5 %    % Eosinophils 5 %    % Basophils 1 %    % Immature Granulocytes 1 %    NRBCs per 100 WBC 0 <1 /100    Absolute Neutrophils 8.0 1.6 - 8.3 10e3/uL    Absolute Lymphocytes 2.4 0.8 - 5.3 10e3/uL    Absolute Monocytes 0.6 0.0 - 1.3 10e3/uL    Absolute Eosinophils 0.6 0.0 - 0.7 10e3/uL    Absolute Basophils 0.1 0.0 - 0.2 10e3/uL    Absolute Immature Granulocytes 0.1 <=0.4 10e3/uL    Absolute NRBCs 0.0 10e3/uL   Extra Red Top Tube   Result Value Ref Range    Hold Specimen JIC    Result Value Ref Range    Hemoglobin 8.7 (L) 11.7 - 15.7 g/dL   Troponin T, High Sensitivity (now)   Result Value Ref Range    Troponin T, High Sensitivity 42 (H) <=14 ng/L   Result Value Ref Range    Magnesium 1.7 1.7 - 2.3 mg/dL   Troponin T, High Sensitivity (now)   Result Value Ref Range    Troponin T, High Sensitivity 37 (H) <=14 ng/L   Adult Type and Screen   Result Value Ref Range    ABO/RH(D) A POS     Antibody Screen Negative Negative    SPECIMEN EXPIRATION DATE [id number]        RADIOLOGY:  Reviewed all pertinent imaging. Please see official radiology report.  Chest XR,  PA & LAT   Final Result   IMPRESSION: Minimal scarring or linear atelectasis in the right middle lobe and lingula, unchanged. Lungs are otherwise clear. No pleural effusions. Heart size and pulmonary vascularity are within normal limits.          EKG:    Performed at: [facility] Emergency Department on 5/28/24 at 10:13:33    Impression: sinus rhythm. Nonspecific T wave abnormality. Prolonged QT.     Rate: 79  Rhythm: sinus rhythm  Axis: 57  GA Interval: 158  QRS Interval: 92  QTc Interval: 479  ST Changes: nonspecific T wave abnormality. Prolonged QT.  Comparison: when compared with ECG of  9/15/11 at 09:47, nonspecific change in ST segment in lateral leads. Nonspecific T wave abnormality now evident on lateral leads. QT has lengthened.     I have independently reviewed and interpreted the EKG(s) documented above.      I, Lashanda Arnoldo, am serving as a scribe to document services personally performed by Viet English MD based on my observation and the provider's statements to me. I, Viet English MD, attest that Lashanda Mclaughlin is acting in a scribe capacity, has observed my performance of the services and has documented them in accordance with my direction.    Viet English MD  [facility] EMERGENCY DEPARTMENT  [address] [place] [id number] [phone]      Viet English MD  05/28/24 0507

## 2024-05-29 ENCOUNTER — TELEPHONE (OUTPATIENT)
Dept: SURGERY | Facility: CLINIC | Age: 59
End: 2024-05-29
Payer: MEDICARE

## 2024-05-29 NOTE — TELEPHONE ENCOUNTER
[facility] Call Center    Phone Message    May a detailed message be left on voicemail: yes     Reason for Call: Appointment Intake    Referring Provider Name: Viet English MD  Diagnosis and/or Symptoms: Bright red blood per rectum [K62.5]     Patient is being referred for rectal bleeding. Please review per scheduling guidelines. Thanks!     Action Taken: Message routed to:  [facility] TANESHA    Travel Screening: Not Applicable

## 2024-05-29 NOTE — TELEPHONE ENCOUNTER
Diagnosis, Referred by & from: [email or web address]   Appt date: 7/9/2024   NOTES STATUS DETAILS   OFFICE NOTE from referring provider N/A    OFFICE NOTE from other specialist N/A    DISCHARGE SUMMARY from hospital N/A    DISCHARGE REPORT from the ER Care Everywhere / Internal [facility]:  5/28/24 - ED OV with Dr. English    [facility] Room:  9/17/21 - ED OV with Dr. Monteiro   OPERATIVE REPORT N/A    MEDICATION LIST Internal    LABS N/A    DIAGNOSTIC PROCEDURES N/A    IMAGING (DISC & REPORT) N/A

## 2024-05-30 ENCOUNTER — LAB REQUISITION (OUTPATIENT)
Dept: LAB | Facility: CLINIC | Age: 59
End: 2024-05-30
Payer: MEDICARE

## 2024-05-30 DIAGNOSIS — F10.10 ALCOHOL ABUSE, UNCOMPLICATED: ICD-10-CM

## 2024-05-30 DIAGNOSIS — D64.9 ANEMIA, UNSPECIFIED: ICD-10-CM

## 2024-05-30 DIAGNOSIS — I10 ESSENTIAL (PRIMARY) HYPERTENSION: ICD-10-CM

## 2024-05-30 DIAGNOSIS — E87.6 HYPOKALEMIA: ICD-10-CM

## 2024-05-30 LAB
BASOPHILS # BLD AUTO: 0.1 10E3/UL (ref 0–0.2)
BASOPHILS NFR BLD AUTO: 1 %
EOSINOPHIL # BLD AUTO: 0.6 10E3/UL (ref 0–0.7)
EOSINOPHIL NFR BLD AUTO: 5 %
ERYTHROCYTE [DISTWIDTH] IN BLOOD BY AUTOMATED COUNT: 14 % (ref 10–15)
FOLATE SERPL-MCNC: 2.8 NG/ML (ref 4.6–34.8)
HCT VFR BLD AUTO: 22.4 % (ref 35–47)
HGB BLD-MCNC: 7.9 G/DL (ref 11.7–15.7)
IMM GRANULOCYTES # BLD: 0.1 10E3/UL
IMM GRANULOCYTES NFR BLD: 1 %
LYMPHOCYTES # BLD AUTO: 3.2 10E3/UL (ref 0.8–5.3)
LYMPHOCYTES NFR BLD AUTO: 27 %
MCH RBC QN AUTO: 38.7 PG (ref 26.5–33)
MCHC RBC AUTO-ENTMCNC: 35.3 G/DL (ref 31.5–36.5)
MCV RBC AUTO: 110 FL (ref 78–100)
MONOCYTES # BLD AUTO: 0.7 10E3/UL (ref 0–1.3)
MONOCYTES NFR BLD AUTO: 6 %
NEUTROPHILS # BLD AUTO: 7.2 10E3/UL (ref 1.6–8.3)
NEUTROPHILS NFR BLD AUTO: 60 %
NRBC # BLD AUTO: 0 10E3/UL
NRBC BLD AUTO-RTO: 0 /100
PLATELET # BLD AUTO: 297 10E3/UL (ref 150–450)
RBC # BLD AUTO: 2.04 10E6/UL (ref 3.8–5.2)
WBC # BLD AUTO: 11.8 10E3/UL (ref 4–11)

## 2024-05-30 PROCEDURE — 82607 VITAMIN B-12: CPT | Mod: ORL | Performed by: FAMILY MEDICINE

## 2024-05-30 PROCEDURE — 85025 COMPLETE CBC W/AUTO DIFF WBC: CPT | Mod: ORL | Performed by: FAMILY MEDICINE

## 2024-05-30 PROCEDURE — 82746 ASSAY OF FOLIC ACID SERUM: CPT | Mod: ORL | Performed by: FAMILY MEDICINE

## 2024-05-30 PROCEDURE — 80053 COMPREHEN METABOLIC PANEL: CPT | Mod: ORL | Performed by: FAMILY MEDICINE

## 2024-05-30 PROCEDURE — 83550 IRON BINDING TEST: CPT | Mod: ORL | Performed by: FAMILY MEDICINE

## 2024-05-31 LAB
ALBUMIN SERPL BCG-MCNC: 3.7 G/DL (ref 3.5–5.2)
ALP SERPL-CCNC: 65 U/L (ref 40–150)
ALT SERPL W P-5'-P-CCNC: 28 U/L (ref 0–50)
ANION GAP SERPL CALCULATED.3IONS-SCNC: 14 MMOL/L (ref 7–15)
AST SERPL W P-5'-P-CCNC: 34 U/L (ref 0–45)
BILIRUB SERPL-MCNC: 0.2 MG/DL
BUN SERPL-MCNC: 23.6 MG/DL (ref 6–20)
CALCIUM SERPL-MCNC: 8.6 MG/DL (ref 8.6–10)
CHLORIDE SERPL-SCNC: 109 MMOL/L (ref 98–107)
CREAT SERPL-MCNC: 1.87 MG/DL (ref 0.51–0.95)
DEPRECATED HCO3 PLAS-SCNC: 19 MMOL/L (ref 22–29)
EGFRCR SERPLBLD CKD-EPI 2021: 31 ML/MIN/1.73M2
GLUCOSE SERPL-MCNC: 110 MG/DL (ref 70–99)
IRON BINDING CAPACITY (ROCHE): 261 UG/DL (ref 240–430)
IRON SATN MFR SERPL: 19 % (ref 15–46)
IRON SERPL-MCNC: 49 UG/DL (ref 37–145)
POTASSIUM SERPL-SCNC: 3.4 MMOL/L (ref 3.4–5.3)
PROT SERPL-MCNC: 5.6 G/DL (ref 6.4–8.3)
SODIUM SERPL-SCNC: 142 MMOL/L (ref 135–145)
VIT B12 SERPL-MCNC: 280 PG/ML (ref 232–1245)

## 2024-06-10 LAB
ATRIAL RATE - MUSE: 79 BPM
DIASTOLIC BLOOD PRESSURE - MUSE: NORMAL MMHG
INTERPRETATION ECG - MUSE: NORMAL
P AXIS - MUSE: 55 DEGREES
PR INTERVAL - MUSE: 158 MS
QRS DURATION - MUSE: 92 MS
QT - MUSE: 418 MS
QTC - MUSE: 479 MS
R AXIS - MUSE: 57 DEGREES
SYSTOLIC BLOOD PRESSURE - MUSE: NORMAL MMHG
T AXIS - MUSE: 82 DEGREES
VENTRICULAR RATE- MUSE: 79 BPM

## 2024-06-27 ENCOUNTER — LAB REQUISITION (OUTPATIENT)
Dept: LAB | Facility: CLINIC | Age: 59
End: 2024-06-27
Payer: MEDICARE

## 2024-06-27 DIAGNOSIS — R94.4 ABNORMAL RESULTS OF KIDNEY FUNCTION STUDIES: ICD-10-CM

## 2024-06-27 DIAGNOSIS — E87.6 HYPOKALEMIA: ICD-10-CM

## 2024-06-27 DIAGNOSIS — D64.9 ANEMIA, UNSPECIFIED: ICD-10-CM

## 2024-06-27 DIAGNOSIS — I10 ESSENTIAL (PRIMARY) HYPERTENSION: ICD-10-CM

## 2024-06-27 LAB
ALBUMIN SERPL BCG-MCNC: 4.3 G/DL (ref 3.5–5.2)
ALP SERPL-CCNC: 83 U/L (ref 40–150)
ALT SERPL W P-5'-P-CCNC: 45 U/L (ref 0–50)
ANION GAP SERPL CALCULATED.3IONS-SCNC: 14 MMOL/L (ref 7–15)
AST SERPL W P-5'-P-CCNC: 66 U/L (ref 0–45)
BASOPHILS # BLD AUTO: 0.1 10E3/UL (ref 0–0.2)
BASOPHILS NFR BLD AUTO: 1 %
BILIRUB SERPL-MCNC: 0.3 MG/DL
BUN SERPL-MCNC: 25.3 MG/DL (ref 6–20)
CALCIUM SERPL-MCNC: 8.9 MG/DL (ref 8.6–10)
CHLORIDE SERPL-SCNC: 107 MMOL/L (ref 98–107)
CREAT SERPL-MCNC: 1.99 MG/DL (ref 0.51–0.95)
DEPRECATED HCO3 PLAS-SCNC: 19 MMOL/L (ref 22–29)
EGFRCR SERPLBLD CKD-EPI 2021: 29 ML/MIN/1.73M2
EOSINOPHIL # BLD AUTO: 0.6 10E3/UL (ref 0–0.7)
EOSINOPHIL NFR BLD AUTO: 6 %
ERYTHROCYTE [DISTWIDTH] IN BLOOD BY AUTOMATED COUNT: 13.6 % (ref 10–15)
GLUCOSE SERPL-MCNC: 121 MG/DL (ref 70–99)
HCT VFR BLD AUTO: 31.7 % (ref 35–47)
HGB BLD-MCNC: 11 G/DL (ref 11.7–15.7)
IMM GRANULOCYTES # BLD: 0.1 10E3/UL
IMM GRANULOCYTES NFR BLD: 1 %
IRON BINDING CAPACITY (ROCHE): 340 UG/DL (ref 240–430)
IRON SATN MFR SERPL: 13 % (ref 15–46)
IRON SERPL-MCNC: 43 UG/DL (ref 37–145)
LYMPHOCYTES # BLD AUTO: 1.7 10E3/UL (ref 0.8–5.3)
LYMPHOCYTES NFR BLD AUTO: 19 %
MCH RBC QN AUTO: 37.8 PG (ref 26.5–33)
MCHC RBC AUTO-ENTMCNC: 34.7 G/DL (ref 31.5–36.5)
MCV RBC AUTO: 109 FL (ref 78–100)
MONOCYTES # BLD AUTO: 0.6 10E3/UL (ref 0–1.3)
MONOCYTES NFR BLD AUTO: 7 %
NEUTROPHILS # BLD AUTO: 6.1 10E3/UL (ref 1.6–8.3)
NEUTROPHILS NFR BLD AUTO: 66 %
NRBC # BLD AUTO: 0 10E3/UL
NRBC BLD AUTO-RTO: 0 /100
PLATELET # BLD AUTO: 394 10E3/UL (ref 150–450)
POTASSIUM SERPL-SCNC: 3.4 MMOL/L (ref 3.4–5.3)
PROT SERPL-MCNC: 6.9 G/DL (ref 6.4–8.3)
RBC # BLD AUTO: 2.91 10E6/UL (ref 3.8–5.2)
SODIUM SERPL-SCNC: 140 MMOL/L (ref 135–145)
VIT B12 SERPL-MCNC: 308 PG/ML (ref 232–1245)
WBC # BLD AUTO: 9.1 10E3/UL (ref 4–11)

## 2024-06-27 PROCEDURE — 82607 VITAMIN B-12: CPT | Mod: ORL | Performed by: FAMILY MEDICINE

## 2024-06-27 PROCEDURE — 85025 COMPLETE CBC W/AUTO DIFF WBC: CPT | Mod: ORL | Performed by: FAMILY MEDICINE

## 2024-06-27 PROCEDURE — 80053 COMPREHEN METABOLIC PANEL: CPT | Mod: ORL | Performed by: FAMILY MEDICINE

## 2024-06-27 PROCEDURE — 83550 IRON BINDING TEST: CPT | Mod: ORL | Performed by: FAMILY MEDICINE

## 2024-07-09 ENCOUNTER — PRE VISIT (OUTPATIENT)
Dept: SURGERY | Facility: CLINIC | Age: 59
End: 2024-07-09

## 2024-09-11 ENCOUNTER — LAB REQUISITION (OUTPATIENT)
Dept: LAB | Facility: CLINIC | Age: 59
End: 2024-09-11
Payer: MEDICARE

## 2024-09-11 DIAGNOSIS — D64.9 ANEMIA, UNSPECIFIED: ICD-10-CM

## 2024-09-11 DIAGNOSIS — E55.9 VITAMIN D DEFICIENCY, UNSPECIFIED: ICD-10-CM

## 2024-09-11 PROCEDURE — 82607 VITAMIN B-12: CPT | Mod: ORL | Performed by: FAMILY MEDICINE

## 2024-09-11 PROCEDURE — 82746 ASSAY OF FOLIC ACID SERUM: CPT | Mod: ORL | Performed by: FAMILY MEDICINE

## 2024-09-11 PROCEDURE — 83550 IRON BINDING TEST: CPT | Mod: ORL | Performed by: FAMILY MEDICINE

## 2024-09-11 PROCEDURE — 82306 VITAMIN D 25 HYDROXY: CPT | Mod: ORL | Performed by: FAMILY MEDICINE

## 2024-09-12 ENCOUNTER — LAB REQUISITION (OUTPATIENT)
Dept: LAB | Facility: CLINIC | Age: 59
End: 2024-09-12
Payer: MEDICARE

## 2024-09-12 DIAGNOSIS — Z87.42 PERSONAL HISTORY OF OTHER DISEASES OF THE FEMALE GENITAL TRACT: ICD-10-CM

## 2024-09-12 LAB
FOLATE SERPL-MCNC: 7.2 NG/ML (ref 4.6–34.8)
IRON BINDING CAPACITY (ROCHE): 350 UG/DL (ref 240–430)
IRON SATN MFR SERPL: 31 % (ref 15–46)
IRON SERPL-MCNC: 110 UG/DL (ref 37–145)
VIT B12 SERPL-MCNC: 337 PG/ML (ref 232–1245)
VIT D+METAB SERPL-MCNC: 26 NG/ML (ref 20–50)

## 2024-09-12 PROCEDURE — G0145 SCR C/V CYTO,THINLAYER,RESCR: HCPCS | Mod: ORL | Performed by: FAMILY MEDICINE

## 2024-09-12 PROCEDURE — 87624 HPV HI-RISK TYP POOLED RSLT: CPT | Mod: ORL | Performed by: FAMILY MEDICINE

## 2024-09-13 LAB
HPV HR 12 DNA CVX QL NAA+PROBE: NEGATIVE
HPV16 DNA CVX QL NAA+PROBE: NEGATIVE
HPV18 DNA CVX QL NAA+PROBE: NEGATIVE
HUMAN PAPILLOMA VIRUS FINAL DIAGNOSIS: NORMAL

## 2024-09-17 LAB
BKR AP ASSOCIATED HPV REPORT: NORMAL
BKR LAB AP GYN ADEQUACY: NORMAL
BKR LAB AP GYN INTERPRETATION: NORMAL
BKR LAB AP LMP: NORMAL
BKR LAB AP PREVIOUS ABNL DX: NORMAL
BKR LAB AP PREVIOUS ABNORMAL: NORMAL
PATH REPORT.COMMENTS IMP SPEC: NORMAL
PATH REPORT.COMMENTS IMP SPEC: NORMAL
PATH REPORT.RELEVANT HX SPEC: NORMAL

## 2025-05-19 ENCOUNTER — LAB REQUISITION (OUTPATIENT)
Dept: LAB | Facility: CLINIC | Age: 60
End: 2025-05-19
Payer: MEDICARE

## 2025-05-19 DIAGNOSIS — I10 ESSENTIAL (PRIMARY) HYPERTENSION: ICD-10-CM

## 2025-05-19 DIAGNOSIS — M79.605 PAIN IN LEFT LEG: ICD-10-CM

## 2025-05-19 DIAGNOSIS — M79.604 PAIN IN RIGHT LEG: ICD-10-CM

## 2025-05-19 LAB — ERYTHROCYTE [SEDIMENTATION RATE] IN BLOOD BY WESTERGREN METHOD: 11 MM/HR (ref 0–30)

## 2025-05-19 PROCEDURE — 82550 ASSAY OF CK (CPK): CPT | Mod: ORL | Performed by: FAMILY MEDICINE

## 2025-05-19 PROCEDURE — 80053 COMPREHEN METABOLIC PANEL: CPT | Mod: ORL | Performed by: FAMILY MEDICINE

## 2025-05-19 PROCEDURE — 85652 RBC SED RATE AUTOMATED: CPT | Mod: ORL | Performed by: FAMILY MEDICINE

## 2025-05-20 LAB
ALBUMIN SERPL BCG-MCNC: 4.2 G/DL (ref 3.5–5.2)
ALP SERPL-CCNC: 87 U/L (ref 40–150)
ALT SERPL W P-5'-P-CCNC: 25 U/L (ref 0–50)
ANION GAP SERPL CALCULATED.3IONS-SCNC: 16 MMOL/L (ref 7–15)
AST SERPL W P-5'-P-CCNC: 33 U/L (ref 0–45)
BILIRUB SERPL-MCNC: 0.3 MG/DL
BUN SERPL-MCNC: 28.9 MG/DL (ref 6–20)
CALCIUM SERPL-MCNC: 9.3 MG/DL (ref 8.8–10.4)
CHLORIDE SERPL-SCNC: 107 MMOL/L (ref 98–107)
CK SERPL-CCNC: 126 U/L (ref 26–192)
CREAT SERPL-MCNC: 2.36 MG/DL (ref 0.51–0.95)
EGFRCR SERPLBLD CKD-EPI 2021: 23 ML/MIN/1.73M2
GLUCOSE SERPL-MCNC: 105 MG/DL (ref 70–99)
HCO3 SERPL-SCNC: 19 MMOL/L (ref 22–29)
POTASSIUM SERPL-SCNC: 3.4 MMOL/L (ref 3.4–5.3)
PROT SERPL-MCNC: 6.7 G/DL (ref 6.4–8.3)
SODIUM SERPL-SCNC: 142 MMOL/L (ref 135–145)